# Patient Record
Sex: MALE | Race: WHITE | NOT HISPANIC OR LATINO | Employment: FULL TIME | URBAN - METROPOLITAN AREA
[De-identification: names, ages, dates, MRNs, and addresses within clinical notes are randomized per-mention and may not be internally consistent; named-entity substitution may affect disease eponyms.]

---

## 2017-08-04 ENCOUNTER — PATIENT OUTREACH (OUTPATIENT)
Dept: ADMINISTRATIVE | Facility: HOSPITAL | Age: 47
End: 2017-08-04

## 2020-05-12 ENCOUNTER — TELEPHONE (OUTPATIENT)
Dept: PULMONOLOGY | Facility: CLINIC | Age: 50
End: 2020-05-12

## 2020-05-12 DIAGNOSIS — R06.02 SOB (SHORTNESS OF BREATH): Primary | ICD-10-CM

## 2020-05-12 NOTE — TELEPHONE ENCOUNTER
----- Message from Rosemary Lexis sent at 5/12/2020 11:46 AM CDT -----  Contact: sister  Type: Needs Medical Advice    Who Called:      Best Call Back Number:   Additional Information: Requesting a call back from Nurse, regarding sister has questions about est care appt ,please call back with advice

## 2020-06-18 ENCOUNTER — TELEPHONE (OUTPATIENT)
Dept: PULMONOLOGY | Facility: CLINIC | Age: 50
End: 2020-06-18

## 2020-08-12 ENCOUNTER — OFFICE VISIT (OUTPATIENT)
Dept: PULMONOLOGY | Facility: CLINIC | Age: 50
End: 2020-08-12
Payer: COMMERCIAL

## 2020-08-12 VITALS
OXYGEN SATURATION: 96 % | WEIGHT: 265 LBS | DIASTOLIC BLOOD PRESSURE: 82 MMHG | SYSTOLIC BLOOD PRESSURE: 134 MMHG | HEIGHT: 72 IN | HEART RATE: 104 BPM | BODY MASS INDEX: 35.89 KG/M2 | RESPIRATION RATE: 19 BRPM

## 2020-08-12 DIAGNOSIS — U07.1 COVID-19: Primary | ICD-10-CM

## 2020-08-12 DIAGNOSIS — G47.33 OSA (OBSTRUCTIVE SLEEP APNEA): ICD-10-CM

## 2020-08-12 DIAGNOSIS — R06.02 SHORTNESS OF BREATH: ICD-10-CM

## 2020-08-12 DIAGNOSIS — R91.1 SOLITARY PULMONARY NODULE: ICD-10-CM

## 2020-08-12 DIAGNOSIS — J44.89 ASTHMA WITH COPD: Primary | ICD-10-CM

## 2020-08-12 PROCEDURE — 99205 PR OFFICE/OUTPT VISIT, NEW, LEVL V, 60-74 MIN: ICD-10-PCS | Mod: S$GLB,,, | Performed by: INTERNAL MEDICINE

## 2020-08-12 PROCEDURE — 3075F SYST BP GE 130 - 139MM HG: CPT | Mod: CPTII,S$GLB,, | Performed by: INTERNAL MEDICINE

## 2020-08-12 PROCEDURE — 3079F DIAST BP 80-89 MM HG: CPT | Mod: CPTII,S$GLB,, | Performed by: INTERNAL MEDICINE

## 2020-08-12 PROCEDURE — 99999 PR PBB SHADOW E&M-EST. PATIENT-LVL III: CPT | Mod: PBBFAC,,, | Performed by: INTERNAL MEDICINE

## 2020-08-12 PROCEDURE — 99999 PR PBB SHADOW E&M-EST. PATIENT-LVL III: ICD-10-PCS | Mod: PBBFAC,,, | Performed by: INTERNAL MEDICINE

## 2020-08-12 PROCEDURE — 3075F PR MOST RECENT SYSTOLIC BLOOD PRESS GE 130-139MM HG: ICD-10-PCS | Mod: CPTII,S$GLB,, | Performed by: INTERNAL MEDICINE

## 2020-08-12 PROCEDURE — 3079F PR MOST RECENT DIASTOLIC BLOOD PRESSURE 80-89 MM HG: ICD-10-PCS | Mod: CPTII,S$GLB,, | Performed by: INTERNAL MEDICINE

## 2020-08-12 PROCEDURE — 3008F PR BODY MASS INDEX (BMI) DOCUMENTED: ICD-10-PCS | Mod: CPTII,S$GLB,, | Performed by: INTERNAL MEDICINE

## 2020-08-12 PROCEDURE — 3008F BODY MASS INDEX DOCD: CPT | Mod: CPTII,S$GLB,, | Performed by: INTERNAL MEDICINE

## 2020-08-12 PROCEDURE — 99205 OFFICE O/P NEW HI 60 MIN: CPT | Mod: S$GLB,,, | Performed by: INTERNAL MEDICINE

## 2020-08-12 RX ORDER — ALBUTEROL SULFATE 90 UG/1
2 AEROSOL, METERED RESPIRATORY (INHALATION) EVERY 4 HOURS PRN
Qty: 18 G | Refills: 11 | Status: SHIPPED | OUTPATIENT
Start: 2020-08-12 | End: 2020-09-08 | Stop reason: SDUPTHER

## 2020-08-12 RX ORDER — ALBUTEROL SULFATE 90 UG/1
2 AEROSOL, METERED RESPIRATORY (INHALATION) EVERY 4 HOURS PRN
Qty: 18 G | Refills: 11 | Status: SHIPPED | OUTPATIENT
Start: 2020-08-12 | End: 2020-08-12 | Stop reason: SDUPTHER

## 2020-08-12 NOTE — PROGRESS NOTES
Subjective:     Patient ID: Kvng Ba Jr. is a 49 y.o. male.    Chief Complaint:      HPI]    COPD  He presents for evaluation and treatment of Asthma/COPD. The patient is currently having symptoms / an exacerbation. Current symptoms include acute dyspnea, chronic dyspnea, dyspnea after 2 blocks and dyspnea after 2 flights of stairs. Symptoms have been present since several months ago and have been gradually worsening. He denies chills and fever. Associated symptoms include chest pain, poor exercise tolerance and shortness of breath.  This episode appears to have been triggered by no identifiable factor. Treatments tried for the current exacerbation: none. The patient has been having similar episodes for approximately 3 years. He uses 2 pillows at night. Patient currently is not on home oxygen therapy.. The patient is having no constitutional symptoms, denying fever, chills, anorexia, or weight loss. The patient has not been hospitalized for this condition before. He has a history of 35 pack years. The patient is experiencing exercise intolerance (difficulty climbing 3 flights of stairs).    Insomnia and Obstructive Sleep Apnea with GERD       Sleep Apnea  He presents for a sleep evaluation. He complains of snoring, periods of not breathing, decreased memory, decreased concentration, excessive daytime sleepiness, falling asleep while driving, reading, watching television, feels sleepy during the day, take naps during the day.  Symptoms began 15 years years ago, gradually worsening since that time.  He goes to sleep at 3 - am weekdays and  weekends. He awakens 7  weekdays and  weekends. He falls asleep in 5 minutes.  Collar size na. He denies knees buckling with laughing, completely or partially paralyzed while falling asleep or waking up. Previous evaluation and treatment has included PSG. EPWORTH 16    Hx of restless legs  Hx of polysomnogram in 2006 -  At [facility] -   Leg and back  probelms    Past Medical History:   Diagnosis Date    ADHD (attention deficit hyperactivity disorder)     Anxiety     Chronic back pain     DJD (degenerative joint disease) of lumbar spine     GERD (gastroesophageal reflux disease)     High cholesterol      Past Surgical History:   Procedure Laterality Date    ARTHROPLASTY      R knee    FEMUR FRACTURE SURGERY      jose in place    LUMBAR DISCECTOMY      L4-L5    LUMBAR FUSION      L4-L5     Review of patient's allergies indicates:  No Known Allergies  Current Outpatient Medications on File Prior to Visit   Medication Sig Dispense Refill    avanafil 200 mg Tab Take 200 mg by mouth once daily. (Patient not taking: Reported on 2020) 3 tablet 6    metoprolol succinate (TOPROL-XL) 25 MG 24 hr tablet Take 1 tablet (25 mg total) by mouth once daily. (Patient not taking: Reported on 2020) 30 tablet 11     No current facility-administered medications on file prior to visit.      Social History     Socioeconomic History    Marital status:      Spouse name: Not on file    Number of children: Not on file    Years of education: Not on file    Highest education level: Not on file   Occupational History    Not on file   Social Needs    Financial resource strain: Not on file    Food insecurity     Worry: Not on file     Inability: Not on file    Transportation needs     Medical: Not on file     Non-medical: Not on file   Tobacco Use    Smoking status: Former Smoker     Packs/day: 1.00     Years: 35.00     Pack years: 35.00     Types: Cigarettes     Start date: 1985     Quit date: 2020     Years since quittin.2    Smokeless tobacco: Never Used    Tobacco comment: Quit 2020   Substance and Sexual Activity    Alcohol use: Yes     Comment: weekly    Drug use: No    Sexual activity: Yes   Lifestyle    Physical activity     Days per week: Not on file     Minutes per session: Not on file    Stress: Not on file    Relationships    Social connections     Talks on phone: Not on file     Gets together: Not on file     Attends Religion service: Not on file     Active member of club or organization: Not on file     Attends meetings of clubs or organizations: Not on file     Relationship status: Not on file   Other Topics Concern    Not on file   Social History Narrative    Not on file     Family History   Problem Relation Age of Onset    Hypertension Mother     Heart disease Mother     Cancer Mother     Hypertension Father     Cancer Father        Review of Systems   Constitutional: Positive for fatigue. Negative for fever.   HENT: Positive for postnasal drip, rhinorrhea and congestion.    Eyes: Negative for redness and itching.   Respiratory: Positive for cough, sputum production, shortness of breath, dyspnea on extertion, use of rescue inhaler and Paroxysmal Nocturnal Dyspnea.    Cardiovascular: Negative for chest pain, palpitations and leg swelling.   Genitourinary: Negative for difficulty urinating and hematuria.   Endocrine: Negative for cold intolerance and heat intolerance.    Skin: Negative for rash.   Gastrointestinal: Negative for nausea and abdominal pain.   Neurological: Negative for dizziness, syncope, weakness and light-headedness.   Hematological: Negative for adenopathy. Does not bruise/bleed easily.   Psychiatric/Behavioral: Negative for sleep disturbance. The patient is not nervous/anxious.        Objective:      /82   Pulse 104   Resp 19   Ht 6' (1.829 m)   Wt 120.2 kg (264 lb 15.9 oz)   SpO2 96%   BMI 35.94 kg/m²   Physical Exam  Vitals signs and nursing note reviewed.   Constitutional:       Appearance: He is well-developed.   HENT:      Head: Normocephalic and atraumatic.   Eyes:      Conjunctiva/sclera: Conjunctivae normal.      Pupils: Pupils are equal, round, and reactive to light.   Neck:      Musculoskeletal: Neck supple.      Thyroid: No thyromegaly.      Vascular: No JVD.       Trachea: No tracheal deviation.   Cardiovascular:      Rate and Rhythm: Normal rate and regular rhythm.      Heart sounds: No murmur.   Pulmonary:      Breath sounds: Examination of the right-lower field reveals wheezing. Examination of the left-lower field reveals wheezing. Decreased breath sounds and wheezing present. No rhonchi or rales.   Abdominal:      General: Bowel sounds are normal.      Palpations: Abdomen is soft.   Musculoskeletal: Normal range of motion.         General: No tenderness.   Lymphadenopathy:      Cervical: No cervical adenopathy.   Skin:     General: Skin is warm and dry.   Neurological:      Mental Status: He is alert and oriented to person, place, and time.       Personal Diagnostic Review  Na    X-Ray Chest PA And Lateral  Narrative: Two-view chest x-ray.05/10/14 22:49:00    Clinical indication: Chest pain    Heart size is normal. The lung fields are clear. No acute pulmonary infiltrate.  Impression:      Negative two-view chest x-ray.    Electronically signed by: LAUREANO RUBIO MD  Date:     05/11/14  Time:    09:41       Office Spirometry Results:     No flowsheet data found.  Pulmonary Studies Review 8/12/2020   SpO2 96   Height 72   Weight 4239.89   BMI (Calculated) 35.9   Predicted Distance 445.54   Predicted Distance Meters (Calculated) 618.02         Assessment:            Asthma with COPD  -     Discontinue: albuterol (PROVENTIL/VENTOLIN HFA) 90 mcg/actuation inhaler; Inhale 2 puffs into the lungs every 4 (four) hours as needed for Wheezing or Shortness of Breath.  Dispense: 18 g; Refill: 11  -     albuterol (PROVENTIL/VENTOLIN HFA) 90 mcg/actuation inhaler; Inhale 2 puffs into the lungs every 4 (four) hours as needed for Wheezing or Shortness of Breath.  Dispense: 18 g; Refill: 11    Solitary pulmonary nodule  -     CT Chest Without Contrast; Future; Expected date: 08/12/2020    Shortness of breath  -     CT Chest Without Contrast; Future; Expected date: 08/12/2020  -      Spirometry without Bronchodilator; Future; Expected date: 08/12/2020  -     Discontinue: albuterol (PROVENTIL/VENTOLIN HFA) 90 mcg/actuation inhaler; Inhale 2 puffs into the lungs every 4 (four) hours as needed for Wheezing or Shortness of Breath.  Dispense: 18 g; Refill: 11  -     albuterol (PROVENTIL/VENTOLIN HFA) 90 mcg/actuation inhaler; Inhale 2 puffs into the lungs every 4 (four) hours as needed for Wheezing or Shortness of Breath.  Dispense: 18 g; Refill: 11    RC (obstructive sleep apnea)  -     Home Sleep Studies; Future; Expected date: 08/12/2020          Outpatient Encounter Medications as of 8/12/2020   Medication Sig Dispense Refill    albuterol (PROVENTIL/VENTOLIN HFA) 90 mcg/actuation inhaler Inhale 2 puffs into the lungs every 4 (four) hours as needed for Wheezing or Shortness of Breath. 18 g 11    avanafil 200 mg Tab Take 200 mg by mouth once daily. (Patient not taking: Reported on 8/12/2020) 3 tablet 6    metoprolol succinate (TOPROL-XL) 25 MG 24 hr tablet Take 1 tablet (25 mg total) by mouth once daily. (Patient not taking: Reported on 8/12/2020) 30 tablet 11    [DISCONTINUED] albuterol (PROVENTIL/VENTOLIN HFA) 90 mcg/actuation inhaler Inhale 2 puffs into the lungs every 4 (four) hours as needed for Wheezing or Shortness of Breath. 18 g 11     No facility-administered encounter medications on file as of 8/12/2020.      Plan:       Requested Prescriptions     Signed Prescriptions Disp Refills    albuterol (PROVENTIL/VENTOLIN HFA) 90 mcg/actuation inhaler 18 g 11     Sig: Inhale 2 puffs into the lungs every 4 (four) hours as needed for Wheezing or Shortness of Breath.     Problem List Items Addressed This Visit     None      Visit Diagnoses     Asthma with COPD    -  Primary    Relevant Medications    albuterol (PROVENTIL/VENTOLIN HFA) 90 mcg/actuation inhaler    Solitary pulmonary nodule        Relevant Orders    CT Chest Without Contrast    Shortness of breath        Relevant Medications     albuterol (PROVENTIL/VENTOLIN HFA) 90 mcg/actuation inhaler    Other Relevant Orders    CT Chest Without Contrast    Spirometry without Bronchodilator    RC (obstructive sleep apnea)        Relevant Orders    Home Sleep Studies             Follow up in about 4 weeks (around 9/9/2020) for Release Medical Records - Sleep Study.    MEDICAL DECISION MAKING: Moderate to high complexity.  Overall, the multiple problems listed are of moderate to high severity that may impact quality of life and activities of daily living. Side effects of medications, treatment plan as well as options and alternatives reviewed and discussed with patient. There was counseling of patient concerning these issues.    Total time spent in face to face counseling and coordination of care - 60  minutes over 50% of time was used in discussion of prognosis, risks, benefits of treatment, instructions and compliance with regimen . Discussion with other physicians or health care providers (DME, NP, pharmacy, respiratory therapy) occurred.

## 2020-08-12 NOTE — PATIENT INSTRUCTIONS
[facility] to schedule sleep study at  [phone] . Usually take 1- 2 days to get insurance company approval.  You will need to schedule at follow up clinic appointment 7 days after the sleep study to review the results.

## 2020-08-13 ENCOUNTER — TELEPHONE (OUTPATIENT)
Dept: PULMONOLOGY | Facility: HOSPITAL | Age: 50
End: 2020-08-13

## 2020-08-15 ENCOUNTER — LAB VISIT (OUTPATIENT)
Dept: OTOLARYNGOLOGY | Facility: CLINIC | Age: 50
End: 2020-08-15
Payer: COMMERCIAL

## 2020-08-15 DIAGNOSIS — U07.1 COVID-19: ICD-10-CM

## 2020-08-15 PROCEDURE — U0003 INFECTIOUS AGENT DETECTION BY NUCLEIC ACID (DNA OR RNA); SEVERE ACUTE RESPIRATORY SYNDROME CORONAVIRUS 2 (SARS-COV-2) (CORONAVIRUS DISEASE [COVID-19]), AMPLIFIED PROBE TECHNIQUE, MAKING USE OF HIGH THROUGHPUT TECHNOLOGIES AS DESCRIBED BY CMS-2020-01-R: HCPCS

## 2020-08-16 LAB — SARS-COV-2 RNA RESP QL NAA+PROBE: NOT DETECTED

## 2020-08-18 ENCOUNTER — HOSPITAL ENCOUNTER (OUTPATIENT)
Dept: RADIOLOGY | Facility: HOSPITAL | Age: 50
Discharge: HOME | End: 2020-08-18
Attending: INTERNAL MEDICINE
Payer: COMMERCIAL

## 2020-08-18 ENCOUNTER — CLINICAL SUPPORT (OUTPATIENT)
Dept: PULMONOLOGY | Facility: CLINIC | Age: 50
End: 2020-08-18
Payer: COMMERCIAL

## 2020-08-18 DIAGNOSIS — R06.02 SHORTNESS OF BREATH: ICD-10-CM

## 2020-08-18 DIAGNOSIS — R91.1 SOLITARY PULMONARY NODULE: ICD-10-CM

## 2020-08-18 PROCEDURE — 71250 CT THORAX DX C-: CPT | Mod: TC

## 2020-08-18 PROCEDURE — 71250 CT THORAX DX C-: CPT | Mod: 26,,, | Performed by: RADIOLOGY

## 2020-08-18 PROCEDURE — 94010 BREATHING CAPACITY TEST: ICD-10-PCS | Mod: S$GLB,,, | Performed by: INTERNAL MEDICINE

## 2020-08-18 PROCEDURE — 71250 CT CHEST WITHOUT CONTRAST: ICD-10-PCS | Mod: 26,,, | Performed by: RADIOLOGY

## 2020-08-18 PROCEDURE — 94010 BREATHING CAPACITY TEST: CPT | Mod: S$GLB,,, | Performed by: INTERNAL MEDICINE

## 2020-08-19 LAB
BRPFT: NORMAL
FEF 25 75 LLN: 2.04
FEF 25 75 PRE REF: 93.7 %
FEF 25 75 REF: 3.76
FEV1 FVC LLN: 68
FEV1 FVC PRE REF: 101.9 %
FEV1 FVC REF: 79
FEV1 LLN: 3.27
FEV1 PRE REF: 86 %
FEV1 REF: 4.19
FVC LLN: 4.18
FVC PRE REF: 84 %
FVC REF: 5.35
PEF LLN: 7.89
PEF PRE REF: 93.3 %
PEF REF: 10.34
PRE FEF 25 75: 3.53 L/S (ref 2.04–5.49)
PRE FET 100: 7.46 SEC
PRE FEV1 FVC: 80.21 % (ref 67.87–89.6)
PRE FEV1: 3.6 L (ref 3.27–5.11)
PRE FVC: 4.49 L (ref 4.18–6.52)
PRE PEF: 9.65 L/S (ref 7.89–12.8)

## 2020-08-19 PROCEDURE — 95800 SLP STDY UNATTENDED: CPT | Mod: 26,,, | Performed by: INTERNAL MEDICINE

## 2020-08-19 PROCEDURE — 95800 PR SLEEP STUDY, UNATTENDED, RECORD HEART RATE/O2 SAT/RESP ANAL/SLEEP TIME: ICD-10-PCS | Mod: 26,,, | Performed by: INTERNAL MEDICINE

## 2020-08-21 DIAGNOSIS — R91.1 SOLITARY PULMONARY NODULE: ICD-10-CM

## 2020-08-21 DIAGNOSIS — K76.9 LIVER DISEASE, UNSPECIFIED: ICD-10-CM

## 2020-08-21 DIAGNOSIS — R93.2 ABNORMAL CT SCAN, LIVER: Primary | ICD-10-CM

## 2020-08-21 RX ORDER — ALPRAZOLAM 0.5 MG/1
0.5 TABLET ORAL
Qty: 1 TABLET | Refills: 0 | Status: SHIPPED | OUTPATIENT
Start: 2020-08-21 | End: 2020-09-08 | Stop reason: ALTCHOICE

## 2020-08-21 NOTE — PROGRESS NOTES
MRI of abdomen ordered  prescription for xanax sent to [facility] for pre procedure sedation

## 2020-08-21 NOTE — Clinical Note
Called patient but unable to contact  MRI of abdomen ordered - please schedule  prescription for xanax sent to [facility] for pre procedure sedation

## 2020-08-24 ENCOUNTER — PROCEDURE VISIT (OUTPATIENT)
Dept: SLEEP MEDICINE | Facility: CLINIC | Age: 50
End: 2020-08-24
Payer: COMMERCIAL

## 2020-08-24 DIAGNOSIS — G47.33 OSA (OBSTRUCTIVE SLEEP APNEA): Primary | ICD-10-CM

## 2020-08-24 PROCEDURE — 95810 POLYSOM 6/> YRS 4/> PARAM: CPT

## 2020-08-24 NOTE — Clinical Note
PHYSICIAN INTERPRETATION AND COMMENTS: Findings are consistent with very severe, non-positional obstructive  sleep apnea (RC), with indications of respiratory control instability. Night #2: AHI 83.0/hr with 7.5 hours data. SpO2 mira was  72.9%. Based on the [employer] practice parameter of CPAP would be the guideline recommendation of  choice. INLAB CPAP titration prefered. Other therapies may include ENT procedures were appropriate, significant weight loss.  Inspire hypoglossal nerve stimulator and nasal PROVENT or mandibular advancement device may also be considered. Close follow  up to ensure resolution of symptoms. Indications of cardiac dysrhythmia are recorded. Please refer to cardiology  CLINICAL HISTORY: 49 year old male presented with: 18 inch neck, BMI of 31, an [place] sleepiness score of 10, history of  hypertension, a previous diagnosis of RC and symptoms of nocturnal snoring, waking up choking and witnessed apneas. Based on

## 2020-08-26 ENCOUNTER — TELEPHONE (OUTPATIENT)
Dept: PULMONOLOGY | Facility: CLINIC | Age: 50
End: 2020-08-26

## 2020-08-26 NOTE — TELEPHONE ENCOUNTER
----- Message from Jose Oden MD sent at 8/21/2020  4:54 PM CDT -----  Your CT of the chest demonstrated some small nodules that are too small to biopsy. Because of your prior smoking history you are consider a high risk patient (even though you stopped smoking years ago). I have ordered aCT of the chest to be performed in one year. The radiologist also described an abnormality of the liver. I have ordered a MRI to further investigate this issue.

## 2020-08-28 ENCOUNTER — TELEPHONE (OUTPATIENT)
Dept: RADIOLOGY | Facility: HOSPITAL | Age: 50
End: 2020-08-28

## 2020-08-28 NOTE — PROCEDURES
Home Sleep Studies    Date/Time: 8/24/2020 8:00 AM  Performed by: Bladimir Garrison MD  Authorized by: Jose Oden MD       PHYSICIAN INTERPRETATION AND COMMENTS: Findings are consistent with very severe, non-positional obstructive  sleep apnea (RC), with indications of respiratory control instability. Night #2: AHI 83.0/hr with 7.5 hours data. SpO2 mira was  72.9%. Based on the American academy Sleep Medicine practice parameter of CPAP would be the guideline recommendation of  choice. INLAB CPAP titration prefered. Other therapies may include ENT procedures were appropriate, significant weight loss.  Inspire hypoglossal nerve stimulator and nasal PROVENT or mandibular advancement device may also be considered. Close follow  up to ensure resolution of symptoms. Indications of cardiac dysrhythmia are recorded. Please refer to cardiology  CLINICAL HISTORY: 49 year old male presented with: 18 inch neck, BMI of 31, an [place] sleepiness score of 10, history of  hypertension, a previous diagnosis of RC and symptoms of nocturnal snoring, waking up choking and witnessed apneas. Based on  the clinical history, the patient has a high pre-test probability of having severe RC. Prior Polysomnography 2006.History of Restless  legs.

## 2020-08-31 ENCOUNTER — LAB VISIT (OUTPATIENT)
Dept: LAB | Facility: HOSPITAL | Age: 50
End: 2020-08-31
Attending: RADIOLOGY
Payer: COMMERCIAL

## 2020-08-31 ENCOUNTER — TELEPHONE (OUTPATIENT)
Dept: PULMONOLOGY | Facility: CLINIC | Age: 50
End: 2020-08-31

## 2020-08-31 ENCOUNTER — HOSPITAL ENCOUNTER (OUTPATIENT)
Dept: RADIOLOGY | Facility: HOSPITAL | Age: 50
Discharge: HOME | End: 2020-08-31
Attending: INTERNAL MEDICINE
Payer: COMMERCIAL

## 2020-08-31 DIAGNOSIS — R93.2 ABNORMAL CT SCAN, LIVER: ICD-10-CM

## 2020-08-31 DIAGNOSIS — K76.9 LIVER DISEASE: Primary | ICD-10-CM

## 2020-08-31 DIAGNOSIS — K76.9 LIVER DISEASE, UNSPECIFIED: ICD-10-CM

## 2020-08-31 DIAGNOSIS — K76.9 LIVER DISEASE: ICD-10-CM

## 2020-08-31 LAB
CREAT SERPL-MCNC: 1.3 MG/DL (ref 0.5–1.4)
EST. GFR  (AFRICAN AMERICAN): >60 ML/MIN/1.73 M^2
EST. GFR  (NON AFRICAN AMERICAN): >60 ML/MIN/1.73 M^2

## 2020-08-31 PROCEDURE — 25500020 PHARM REV CODE 255: Performed by: INTERNAL MEDICINE

## 2020-08-31 PROCEDURE — 74183 MRI ABD W/O CNTR FLWD CNTR: CPT | Mod: 26,,, | Performed by: RADIOLOGY

## 2020-08-31 PROCEDURE — 74183 MRI ABD W/O CNTR FLWD CNTR: CPT | Mod: TC

## 2020-08-31 PROCEDURE — 82565 ASSAY OF CREATININE: CPT

## 2020-08-31 PROCEDURE — 74183 MRI ABDOMEN W WO CONTRAST: ICD-10-PCS | Mod: 26,,, | Performed by: RADIOLOGY

## 2020-08-31 PROCEDURE — 36415 COLL VENOUS BLD VENIPUNCTURE: CPT

## 2020-08-31 PROCEDURE — A9585 GADOBUTROL INJECTION: HCPCS | Performed by: INTERNAL MEDICINE

## 2020-08-31 RX ORDER — GADOBUTROL 604.72 MG/ML
10 INJECTION INTRAVENOUS
Status: COMPLETED | OUTPATIENT
Start: 2020-08-31 | End: 2020-08-31

## 2020-08-31 RX ADMIN — GADOBUTROL 10 ML: 604.72 INJECTION INTRAVENOUS at 11:08

## 2020-08-31 NOTE — TELEPHONE ENCOUNTER
----- Message from Fior Mccoy sent at 8/31/2020  2:08 PM CDT -----  patient needs to be seen 9/8 for followup as he's leaving town on 9/9 and won't be back for couple months. please work in....[phone] (home)

## 2020-09-08 ENCOUNTER — OFFICE VISIT (OUTPATIENT)
Dept: PULMONOLOGY | Facility: CLINIC | Age: 50
End: 2020-09-08
Payer: COMMERCIAL

## 2020-09-08 VITALS
WEIGHT: 271.5 LBS | BODY MASS INDEX: 36.77 KG/M2 | OXYGEN SATURATION: 98 % | HEIGHT: 72 IN | DIASTOLIC BLOOD PRESSURE: 104 MMHG | RESPIRATION RATE: 18 BRPM | SYSTOLIC BLOOD PRESSURE: 140 MMHG | HEART RATE: 91 BPM

## 2020-09-08 DIAGNOSIS — J44.89 ASTHMA WITH COPD: ICD-10-CM

## 2020-09-08 DIAGNOSIS — R06.02 SHORTNESS OF BREATH: ICD-10-CM

## 2020-09-08 DIAGNOSIS — G47.33 OSA (OBSTRUCTIVE SLEEP APNEA): Primary | ICD-10-CM

## 2020-09-08 DIAGNOSIS — G25.81 RESTLESS LEGS SYNDROME (RLS): ICD-10-CM

## 2020-09-08 DIAGNOSIS — I10 ESSENTIAL HYPERTENSION: ICD-10-CM

## 2020-09-08 PROCEDURE — 3008F BODY MASS INDEX DOCD: CPT | Mod: CPTII,S$GLB,, | Performed by: INTERNAL MEDICINE

## 2020-09-08 PROCEDURE — 3077F PR MOST RECENT SYSTOLIC BLOOD PRESSURE >= 140 MM HG: ICD-10-PCS | Mod: CPTII,S$GLB,, | Performed by: INTERNAL MEDICINE

## 2020-09-08 PROCEDURE — 3080F PR MOST RECENT DIASTOLIC BLOOD PRESSURE >= 90 MM HG: ICD-10-PCS | Mod: CPTII,S$GLB,, | Performed by: INTERNAL MEDICINE

## 2020-09-08 PROCEDURE — 99999 PR PBB SHADOW E&M-EST. PATIENT-LVL III: CPT | Mod: PBBFAC,,, | Performed by: INTERNAL MEDICINE

## 2020-09-08 PROCEDURE — 3080F DIAST BP >= 90 MM HG: CPT | Mod: CPTII,S$GLB,, | Performed by: INTERNAL MEDICINE

## 2020-09-08 PROCEDURE — 99214 PR OFFICE/OUTPT VISIT, EST, LEVL IV, 30-39 MIN: ICD-10-PCS | Mod: S$GLB,,, | Performed by: INTERNAL MEDICINE

## 2020-09-08 PROCEDURE — 99999 PR PBB SHADOW E&M-EST. PATIENT-LVL III: ICD-10-PCS | Mod: PBBFAC,,, | Performed by: INTERNAL MEDICINE

## 2020-09-08 PROCEDURE — 99214 OFFICE O/P EST MOD 30 MIN: CPT | Mod: S$GLB,,, | Performed by: INTERNAL MEDICINE

## 2020-09-08 PROCEDURE — 3008F PR BODY MASS INDEX (BMI) DOCUMENTED: ICD-10-PCS | Mod: CPTII,S$GLB,, | Performed by: INTERNAL MEDICINE

## 2020-09-08 PROCEDURE — 3077F SYST BP >= 140 MM HG: CPT | Mod: CPTII,S$GLB,, | Performed by: INTERNAL MEDICINE

## 2020-09-08 RX ORDER — PRAMIPEXOLE DIHYDROCHLORIDE 0.25 MG/1
0.25 TABLET ORAL NIGHTLY
Qty: 30 TABLET | Refills: 11 | Status: SHIPPED | OUTPATIENT
Start: 2020-09-08 | End: 2023-05-11

## 2020-09-08 RX ORDER — METOPROLOL SUCCINATE 25 MG/1
25 TABLET, EXTENDED RELEASE ORAL DAILY
Qty: 30 TABLET | Refills: 11 | Status: SHIPPED | OUTPATIENT
Start: 2020-09-08 | End: 2023-05-11 | Stop reason: SDUPTHER

## 2020-09-08 RX ORDER — ALBUTEROL SULFATE 90 UG/1
2 AEROSOL, METERED RESPIRATORY (INHALATION) EVERY 4 HOURS PRN
Qty: 18 G | Refills: 11 | Status: SHIPPED | OUTPATIENT
Start: 2020-09-08 | End: 2023-05-11 | Stop reason: SDUPTHER

## 2020-09-08 NOTE — PROGRESS NOTES
Subjective:     Patient ID: Kvng Ba Jr. is a 50 y.o. male.    Chief Complaint:      HPI   COPD  He presents for evaluation and treatment of Asthma/COPD. The patient is currently having symptoms / an exacerbation. Current symptoms include acute dyspnea, chronic dyspnea, dyspnea after 2 blocks and dyspnea after 2 flights of stairs. Symptoms have been present since several months ago and have been gradually worsening. He denies chills and fever. Associated symptoms include chest pain, poor exercise tolerance and shortness of breath.  This episode appears to have been triggered by no identifiable factor. Treatments tried for the current exacerbation: none. The patient has been having similar episodes for approximately 3 years. He uses 2 pillows at night. Patient currently is not on home oxygen therapy.. The patient is having no constitutional symptoms, denying fever, chills, anorexia, or weight loss. The patient has not been hospitalized for this condition before. He has a history of 35 pack years. The patient is experiencing exercise intolerance (difficulty climbing 3 flights of stairs).     Insomnia and Obstructive Sleep Apnea with GERD         Sleep Apnea  He presents for a sleep evaluation. He complains of snoring, periods of not breathing, decreased memory, decreased concentration, excessive daytime sleepiness, falling asleep while driving, reading, watching television, feels sleepy during the day, take naps during the day.  Symptoms began 15 years years ago, gradually worsening since that time.  He goes to sleep at 3 - am weekdays and  weekends. He awakens 7  weekdays and  weekends. He falls asleep in 5 minutes.  Collar size na. He denies knees buckling with laughing, completely or partially paralyzed while falling asleep or waking up. Previous evaluation and treatment has included PSG. EPWORTH 16     Hx of restless legs  Hx of polysomnogram in 2006 -  At [facility] - see below  Leg  and back probelms       Home Sleep Studies   Date/Time: 8/24/2020 8:00 AM  Performed by: Bladimir Garrison MD  Authorized by: Jose Oden MD        PHYSICIAN INTERPRETATION AND COMMENTS: Findings are consistent with very severe, non-positional obstructive  sleep apnea (RC), with indications of respiratory control instability. Night #2: AHI 83.0/hr with 7.5 hours data. SpO2 mira was  72.9%. Based on the American academy Sleep Medicine practice parameter of CPAP would be the guideline recommendation of  choice. INLAB CPAP titration prefered. Other therapies may include ENT procedures were appropriate, significant weight loss.  Inspire hypoglossal nerve stimulator and nasal PROVENT or mandibular advancement device may also be considered. Close follow  up to ensure resolution of symptoms. Indications of cardiac dysrhythmia are recorded. Please refer to cardiology  CLINICAL HISTORY: 49 year old male presented with: 18 inch neck, BMI of 31, an Malden sleepiness score of 10, history of  hypertension, a previous diagnosis of RC and symptoms of nocturnal snoring, waking up choking and witnessed apneas. Based on  the clinical history, the patient has a high pre-test probability of having severe RC. Prior Polysomnography 2006.History of Restless  legs.      Past Medical History:   Diagnosis Date    ADHD (attention deficit hyperactivity disorder)     Anxiety     Chronic back pain     DJD (degenerative joint disease) of lumbar spine     GERD (gastroesophageal reflux disease)     High cholesterol      Past Surgical History:   Procedure Laterality Date    ARTHROPLASTY      R knee    FEMUR FRACTURE SURGERY  2002    jose in place    LUMBAR DISCECTOMY  2000    L4-L5    LUMBAR FUSION      L4-L5     Review of patient's allergies indicates:  No Known Allergies  Current Outpatient Medications on File Prior to Visit   Medication Sig Dispense Refill    avanafil 200 mg Tab Take 200 mg by mouth once daily. 3 tablet 6      No current facility-administered medications on file prior to visit.      Social History     Socioeconomic History    Marital status:      Spouse name: Not on file    Number of children: Not on file    Years of education: Not on file    Highest education level: Not on file   Occupational History    Not on file   Social Needs    Financial resource strain: Not on file    Food insecurity     Worry: Not on file     Inability: Not on file    Transportation needs     Medical: Not on file     Non-medical: Not on file   Tobacco Use    Smoking status: Former Smoker     Packs/day: 1.00     Years: 35.00     Pack years: 35.00     Types: Cigarettes     Start date: 1985     Quit date: 2020     Years since quittin.2    Smokeless tobacco: Never Used    Tobacco comment: Quit 2020   Substance and Sexual Activity    Alcohol use: Yes     Comment: weekly    Drug use: No    Sexual activity: Yes   Lifestyle    Physical activity     Days per week: Not on file     Minutes per session: Not on file    Stress: Not on file   Relationships    Social connections     Talks on phone: Not on file     Gets together: Not on file     Attends Mu-ism service: Not on file     Active member of club or organization: Not on file     Attends meetings of clubs or organizations: Not on file     Relationship status: Not on file   Other Topics Concern    Not on file   Social History Narrative    Not on file     Family History   Problem Relation Age of Onset    Hypertension Mother     Heart disease Mother     Cancer Mother     Hypertension Father     Cancer Father        Review of Systems   Constitutional: Positive for fatigue.   HENT: Negative.    Respiratory: Positive for apnea.    Cardiovascular: Negative.    Genitourinary: Negative.    Endocrine: endocrine negative   Musculoskeletal: Negative.    Skin: Negative.    Gastrointestinal: Negative.    Neurological: Negative.    Psychiatric/Behavioral: Positive for  sleep disturbance.       Objective:      BP (!) 140/104   Pulse 91   Resp 18   Ht 6' (1.829 m)   Wt 123.1 kg (271 lb 7.9 oz)   SpO2 98%   BMI 36.82 kg/m²   Physical Exam  Vitals signs and nursing note reviewed.   Constitutional:       Appearance: He is well-developed.   HENT:      Head: Normocephalic and atraumatic.   Eyes:      Conjunctiva/sclera: Conjunctivae normal.      Pupils: Pupils are equal, round, and reactive to light.   Neck:      Musculoskeletal: Neck supple.      Thyroid: No thyromegaly.      Vascular: No JVD.      Trachea: No tracheal deviation.   Cardiovascular:      Rate and Rhythm: Normal rate and regular rhythm.      Heart sounds: Normal heart sounds.   Pulmonary:      Effort: Pulmonary effort is normal.      Breath sounds: Normal breath sounds.   Abdominal:      Palpations: Abdomen is soft.   Musculoskeletal: Normal range of motion.   Lymphadenopathy:      Cervical: No cervical adenopathy.   Skin:     General: Skin is warm and dry.   Neurological:      Mental Status: He is alert and oriented to person, place, and time.       Personal Diagnostic Review  PSG: significant for Obstructive Sleep Apnea   MRI Abdomen W WO Contrast  Narrative: EXAMINATION:  MRI ABDOMEN W WO CONTRAST    CLINICAL HISTORY:  Liver lesion, > 1cm, US nondiagnostic;  Liver disease, unspecified    TECHNIQUE:  Multiplanar multisequence MR imaging of the abdomen is performed with and without contrast.  10 cc of Gadavist was administered without immediate complication.    COMPARISON:  CT of the chest from 08/18/2020    FINDINGS:  Due to the location of the lesion near the dome of the liver, there is poor signal due to the interface between lung and liver, patient body habitus and motion artifact.  As visualized, there is no abnormal enhancement noted in this portion of the liver and no significant increase in enhancement when compared to other areas of focal fatty sparing.  There is diffuse signal dropout on the out of phase  images most consistent with diffuse fatty infiltration.  The gallbladder is present and unremarkable.  No biliary ductal dilation.    There is normal opacification of the portal vein, IVC and hepatic veins.  The adrenals, pancreas, spleen and kidneys are unremarkable in appearance.  No retroperitoneal adenopathy.  Impression: 1. Findings most consistent with diffuse fatty infiltration with some areas of focal fatty sparing including the nodular area of within the dome which is also favored to represent focal fatty sparing.  Please see limitations above.  2. Remaining findings as discussed above.    Electronically signed by: Jeff Evangelista DO  Date:    08/31/2020  Time:    12:30      Office Spirometry Results:     No flowsheet data found.  Pulmonary Studies Review 9/8/2020   SpO2 98   Height 72   Weight 4343.94   BMI (Calculated) 36.8   Predicted Distance 433.55   Predicted Distance Meters (Calculated) 607.81       Polysomnogram   [facility] Sleep Study Report  Patient Name Kvng Ba Study Ordered by Jose Oden  Date of Night 1 08/19/2020 Date of Birth 1970  Date of Night 2 08/20/2020 Identification Number [id number]  Overall AHI* Overall RDI % time < 90% SpO2 Mean SpO2 % time snoring > 30 dB  77 84 16.1 % 93.7 % 39.1 %  PHYSICIAN INTERPRETATION AND COMMENTS: Findings are consistent with very severe, non-positional obstructive  sleep apnea (RC), with indications of respiratory control instability. Night #2: AHI 83.0/hr with 7.5 hours data. SpO2 mira was  72.9%. Based on the American academy Sleep Medicine practice parameter of CPAP would be the guideline recommendation of  choice. INLAB CPAP titration prefered. Other therapies may include ENT procedures were appropriate, significant weight loss.  Inspire hypoglossal nerve stimulator and nasal PROVENT or mandibular advancement device may also be considered. Close follow  up to ensure resolution of symptoms. Indications of cardiac dysrhythmia are  recorded. Please refer to cardiology  CLINICAL HISTORY: 49 year old male presented with: 18 inch neck, BMI of 31, an Frederica sleepiness score of 10, history of  hypertension, a previous diagnosis of RC and symptoms of nocturnal snoring, waking up choking and witnessed apneas. Based on  the clinical history, the patient has a high pre-test probability of having severe RC. Prior Polysomnography 2006.History of Restless  legs.  SLEEP STUDY FINDINGS: Patient underwent a two night Home Sleep Test and by behavioral criteria, slept for approximately  11.7 hours, with a sleep latency of 4 minutes and a sleep efficiency of 91.2%. Very severe sleep disordered breathing (AHI=77) is  noted based on a 4% hypopnea desaturation criteria, with indications of respiratory control instability. The patient slept supine 56.1%  of the night based on valid recording time of 11.74 hours and is 1.1 times as likely to have apneas/hypopneas when supine. The  apneas/hypopneas are accompanied by severe oxygen desaturation (percent time below 90% SpO2: 16.1%, Min SpO2: 72.9%). The  average desaturation across all sleep disordered breathing events is 8.3%. Snoring occurs for 39.1% (30 dB) of the study, 30.8% is  very loud. The mean pulse rate is 80 BPM, with very frequent pulse rate variability (113 events with >= 6 BPM increase/decrease  per hour).  TREATMENT CONSIDERATIONS: Consider nasal continuous positive airway pressure (CPAP) as the initial treatment  choice for very severe obstructive sleep apnea. Consider an attended CPAP titration study. Presence of respiratory control  instability should be evaluated by a sleep specialist prior to initiating positive pressure treatment. If the patient fails CPAP therapy,  begin supplemental oxygen.  DISEASE MANAGEMENT CONSIDERATIONS: Irregularity of the pulse rate signals indicates possible cardiac  dysrhythmia. If clinically appropriate, further cardiac evaluation is suggested. The previous  diagnosis of narcolepsy may need to  be reevaluated after treatment of very severe RC. Insomnia and morning headaches are symptoms that can be associated  with untreated RC.  Signature: Date:  08-28- 2020  08:40:19  CST  Study Review: The raw data of this DANUTA study has been reviewed, with the report confirmed and electronically signed by Bladimir VERDIN Board Certified Sleep Medicine. Caution:The diagnosis of the Obstructive Sleep Apnea Syndrome must be based on all available clinical  data, of which this study is only a part. Thus final diagnosis and treatment recommendations should include information from an examination      Assessment:            RC (obstructive sleep apnea)  -     CPAP FOR HOME USE  -     CPAP/BIPAP SUPPLIES    Shortness of breath  -     albuterol (PROVENTIL/VENTOLIN HFA) 90 mcg/actuation inhaler; Inhale 2 puffs into the lungs every 4 (four) hours as needed for Wheezing or Shortness of Breath.  Dispense: 18 g; Refill: 11    Asthma with COPD  -     albuterol (PROVENTIL/VENTOLIN HFA) 90 mcg/actuation inhaler; Inhale 2 puffs into the lungs every 4 (four) hours as needed for Wheezing or Shortness of Breath.  Dispense: 18 g; Refill: 11    Essential hypertension  -     metoprolol succinate (TOPROL-XL) 25 MG 24 hr tablet; Take 1 tablet (25 mg total) by mouth once daily.  Dispense: 30 tablet; Refill: 11    Restless legs syndrome (RLS)  -     pramipexole (MIRAPEX) 0.25 MG tablet; Take 1 tablet (0.25 mg total) by mouth every evening. For restless legs  Dispense: 30 tablet; Refill: 11          Outpatient Encounter Medications as of 9/8/2020   Medication Sig Dispense Refill    albuterol (PROVENTIL/VENTOLIN HFA) 90 mcg/actuation inhaler Inhale 2 puffs into the lungs every 4 (four) hours as needed for Wheezing or Shortness of Breath. 18 g 11    avanafil 200 mg Tab Take 200 mg by mouth once daily. 3 tablet 6    [DISCONTINUED] albuterol (PROVENTIL/VENTOLIN HFA) 90 mcg/actuation inhaler Inhale  2 puffs into the lungs every 4 (four) hours as needed for Wheezing or Shortness of Breath. 18 g 11    [DISCONTINUED] ALPRAZolam (XANAX) 0.5 MG tablet Take 1 tablet (0.5 mg total) by mouth On call Procedure for Anxiety. 1 tablet 0    metoprolol succinate (TOPROL-XL) 25 MG 24 hr tablet Take 1 tablet (25 mg total) by mouth once daily. 30 tablet 11    pramipexole (MIRAPEX) 0.25 MG tablet Take 1 tablet (0.25 mg total) by mouth every evening. For restless legs 30 tablet 11    [DISCONTINUED] metoprolol succinate (TOPROL-XL) 25 MG 24 hr tablet Take 1 tablet (25 mg total) by mouth once daily. (Patient not taking: Reported on 8/12/2020) 30 tablet 11     No facility-administered encounter medications on file as of 9/8/2020.      Plan:       Requested Prescriptions     Signed Prescriptions Disp Refills    albuterol (PROVENTIL/VENTOLIN HFA) 90 mcg/actuation inhaler 18 g 11     Sig: Inhale 2 puffs into the lungs every 4 (four) hours as needed for Wheezing or Shortness of Breath.    metoprolol succinate (TOPROL-XL) 25 MG 24 hr tablet 30 tablet 11     Sig: Take 1 tablet (25 mg total) by mouth once daily.    pramipexole (MIRAPEX) 0.25 MG tablet 30 tablet 11     Sig: Take 1 tablet (0.25 mg total) by mouth every evening. For restless legs     Problem List Items Addressed This Visit     Asthma with COPD    Relevant Medications    albuterol (PROVENTIL/VENTOLIN HFA) 90 mcg/actuation inhaler    Essential hypertension    Relevant Medications    metoprolol succinate (TOPROL-XL) 25 MG 24 hr tablet    RC (obstructive sleep apnea) - Primary    Relevant Orders    CPAP FOR HOME USE    CPAP/BIPAP SUPPLIES      Other Visit Diagnoses     Shortness of breath        Relevant Medications    albuterol (PROVENTIL/VENTOLIN HFA) 90 mcg/actuation inhaler    Restless legs syndrome (RLS)        Relevant Medications    pramipexole (MIRAPEX) 0.25 MG tablet             Follow up in about 6 weeks (around 10/20/2020) for CPAP download - review on day of  visit.    MEDICAL DECISION MAKING: Moderate to high complexity.  Overall, the multiple problems listed are of moderate to high severity that may impact quality of life and activities of daily living. Side effects of medications, treatment plan as well as options and alternatives reviewed and discussed with patient. There was counseling of patient concerning these issues.    Total time spent in face to face counseling and coordination of care - 30  minutes over 50% of time was used in discussion of prognosis, risks, benefits of treatment, instructions and compliance with regimen . Discussion with other physicians or health care providers (DME, NP, pharmacy, respiratory therapy) occurred.

## 2020-09-08 NOTE — PATIENT INSTRUCTIONS
CPAP HABITUATION PROCEDURE    Charles Prado, Ph.D., [facility] and Jose Oden M.D.  [facility], [facility]    Some people have difficulty adjusting to CPAP/BiPAP/AutoCPAP.  This is not unusual or hard to understand: Breathing with CPAP is different from ordinary breathing, and this difference is aversive to some. The problem can be overcome, however, and the benefits CPAP confers are certainly worth the effort.  Below, you will find a simple and gradual way to get used to CPAP before you try to use it all night, every night.  The essence of this procedure is to relax and let breathing with CPAP become a habit.  It may take about 2 weeks, and involves the followin. CPAP while awake and comfortably seated, during the late evening.     2. CPAP in bed while attempting sleep at night.     3. If your discomfort is too great at any time, discontinue and attempt again later the same night, for the same amount of time.   4. You and your physician may alter the times and pressures if necessary.     5. If you find that it is very easy to get used to CPAP, you may start using it every night when you are comfortable enough to do so.  6. IMPORTANT REMINDER: If you have a cold or sinus congestion it is okay to miss a night or two of CPAP. Consider using antihistamines or decongestants to clear up your sinus congestion prior to sleeping.    DAYS  1-3   Start CPAP while awake and comfortably seated during the late evening, after having prepared for bed.  You may do this while watching television, listening to music or reading. Use for 1 hour, then take off CPAP and go directly to bed to sleep    DAYS  4-6   Start CPAP when you go to bed and use for 1 hour, or until you fall asleep.  If your discomfort is too great at any time, discontinue and attempt again later the same night, for the same designated amount of time (1 hour).     DAYS  7-9   Increase time with CPAP to 2 hours a  "night.  If your discomfort is too great at any time, discontinue and attempt again later the same night, for the same designated amount of time (2 hours).     DAYS 10-12  Increase time with CPAP to 3 hours a night. If your discomfort is too great at any time, discontinue and attempt again later the same night, for the same designated amount of time (3 hours).     DAYS 13-15   Sleep the entire night with CPAP.     OPTIONAL: You may use Progressive Muscle Relaxation (PMR) to help put you at ease when using CPAP; do PMR twice each day, once in the morning or afternoon, and once in the evening just before using CPAP. You may do PMR prior to any attempt until you are comfortable with CPAP.      \  Continuous Positive Airway Pressure Machine Cleaning    One of the most important factors in maintaining CPAP compliance is taking proper care of your CPAP equipment. In order to have successful CPAP therapy, you must be willing to make your treatment a priority in your life, and that means regularly cleaning and maintaining your CPAP equipment. Fortunately, taking proper care of your equipment is pretty easy, and not very time consuming. With a little adjustment to your regular morning routine, your device and accessories will be working at 100% efficiency to get you that much needed sleep you've been longing for.    One of the most frequent questions we get asked is "how often do I need to clean my CPAP equipment?" .    CPAP Humidifier Cleaning and Replacement:  Nearly all current CPAP machines now come with a heated humidification system that helps cut down on morning dry mouth as well as keeping your nasal turbinates from drying out and becoming irritated and inflamed. However, the humidification chamber needs to be cleaned out daily to prevent bacteria build-up as well as calcification. Recommended routine:    Remove chamber from humidifier carefully so water doesn't enter your CPAP machine.    Open chamber and wash with " warm, soapy water.    Rinse well with water and allow to dry on a clean cloth or paper towel out in direct sunlight.    Fill with distilled or sterile water (obtained at any grocery store) Do not use tap water as it may contain minerals and chemicals that can damage components of the machine. It is also not recommended to use filtered water (i.e. through a Jesika filter) for the same reasons.    Once a week the humidifier chamber should be soaked in a solution of 1 part white vinegar 3 parts water for approximately 15-20 minutes before rinsing thoroughly with distilled water.    Some humidifier chambers are  safe, but make sure to check your CPAP machine's manual before cleaning in a .    Humidifier chambers should be replaced as needed.    CPAP Mask Cleaning and Replacement:  Most CPAP mask cushions are made of silicone, a gentle, non-irritating material. However, while silicone is a very comfortable material for masks, it doesn't have a very long lifespan, and without proper care can breakdown quicker than expected. The oil from your skin interacts with the silicone mask and causes breakdown and stiffness. Therefore, cleaning your CPAP mask is crucial in making it efficient as possible. Here's some tips on CPAP mask cleaning and replacement:    Wash mask daily with warm water and mild, non-fragrant soap or purchase CPAP mask specific wipes and detergents. You can use the same type of mild basic facial soap that you use on your face.    Rinse with water and allow to air dry on a clean cloth or paper towel out of direct sunlight.    Before using mask at night, wash your face thoroughly and don't use facial moisturizers. Facial oils and moisturizers can breakdown the silicone faster.    Once a week soak mask in solution of 1 part white vinegar 3 parts water before rinsing in distilled water.    Headgear and chinstraps should be washed as needed by hand using warm soapy water, rinsed well, and air  dried. Do not place headgear or chinstraps in washing machine or dryer.  For replacement schedules of CPAP masks you should check both your 's recommendations and your insurance allowance. However, for most masks it is recommended that you  replace the cushions 1-2 times per month, and the mask every 3-6 months.    CPAP tubing should be cleaned weekly in a sink of warm, soapy water, rinsed well, and left to hang-dry out of direct sunlight.    CPAP Filters Cleaning and Replacement  Your filters are located near the back of the CPAP machine where the device draws air from the room that it compresses to your pressure settings. Nearly all CPAP machines have a disposable white paper filter and some have an additional non-disposable grey filter as well. Here are some cleaning tips for your CPAP filters:    You should clean the grey non-disposable filter at least on a weekly basis. You may have to clean it more regularly if you have pets, smoke inside your house, or if your home is especially hemanth.    Rinse grey filters with water and allow to dry before placing back into your machine.    The grey re-usable filters should be replaced when it begins to look worn or after 6 months.    Replace disposable white paper filters monthly or more frequently if it appears dingy or dirty.    Your CPAP machine itself does not need to be cleaned but you may want to dust it down with a slightly damp cloth as desired.    General CPAP Maintenance & CPAP Cleaning Tips  Make your CPAP equipment cleaning part of your morning routine, allowing the equipment ample time to dry during the day.    Keep machine and accessories out of direct sunlight to avoid damaging them.    Never use bleach to clean accessories.    Other machine accessories such as power cords and data cards may need to be replaced due to equipment malfunctions.    Place machine on a level surface away from objects such as curtains that may interfere with the air  intake.    Always use distilled or sterile water when cleaning components.    Keep track of when you should order replacement parts for your mask and accessories so that you always get the most out of your therapy.    With these simple tips on cleaning and maintaining your CPAP device and accessories, you will assuredly have a much better CPAP therapy experience.    There are a number of machines advertised that clean Continuous Positive Airway Pressure equipment. For most patients this is not necessary. If you have a history of chronic sinus or lung infections this type of cleaning device may be helpful. Unfortunately, at this time most insurance companies and Medicare are not paying for these devices.

## 2020-09-09 PROBLEM — J44.89 ASTHMA WITH COPD: Status: ACTIVE | Noted: 2020-09-09

## 2020-09-09 PROBLEM — I10 ESSENTIAL HYPERTENSION: Status: ACTIVE | Noted: 2020-09-09

## 2020-09-09 PROBLEM — G47.33 OSA (OBSTRUCTIVE SLEEP APNEA): Status: ACTIVE | Noted: 2020-09-09

## 2021-04-28 ENCOUNTER — PATIENT MESSAGE (OUTPATIENT)
Dept: RESEARCH | Facility: HOSPITAL | Age: 51
End: 2021-04-28

## 2023-05-04 DIAGNOSIS — J44.89 ASTHMA WITH COPD: Primary | ICD-10-CM

## 2023-05-11 ENCOUNTER — HOSPITAL ENCOUNTER (OUTPATIENT)
Dept: RADIOLOGY | Facility: HOSPITAL | Age: 53
Discharge: HOME | End: 2023-05-11
Attending: INTERNAL MEDICINE
Payer: COMMERCIAL

## 2023-05-11 ENCOUNTER — OFFICE VISIT (OUTPATIENT)
Dept: PULMONOLOGY | Facility: CLINIC | Age: 53
End: 2023-05-11
Payer: COMMERCIAL

## 2023-05-11 VITALS
WEIGHT: 288.13 LBS | RESPIRATION RATE: 18 BRPM | DIASTOLIC BLOOD PRESSURE: 90 MMHG | SYSTOLIC BLOOD PRESSURE: 140 MMHG | OXYGEN SATURATION: 95 % | HEIGHT: 72 IN | BODY MASS INDEX: 39.02 KG/M2 | HEART RATE: 80 BPM

## 2023-05-11 DIAGNOSIS — I10 ESSENTIAL HYPERTENSION: ICD-10-CM

## 2023-05-11 DIAGNOSIS — N52.8 OTHER MALE ERECTILE DYSFUNCTION: ICD-10-CM

## 2023-05-11 DIAGNOSIS — G47.33 OSA (OBSTRUCTIVE SLEEP APNEA): ICD-10-CM

## 2023-05-11 DIAGNOSIS — R06.02 SHORTNESS OF BREATH: ICD-10-CM

## 2023-05-11 DIAGNOSIS — J44.89 ASTHMA WITH COPD: ICD-10-CM

## 2023-05-11 DIAGNOSIS — J44.89 ASTHMA WITH COPD: Primary | ICD-10-CM

## 2023-05-11 PROCEDURE — 99999 PR PBB SHADOW E&M-EST. PATIENT-LVL III: CPT | Mod: PBBFAC,,, | Performed by: INTERNAL MEDICINE

## 2023-05-11 PROCEDURE — 99214 PR OFFICE/OUTPT VISIT, EST, LEVL IV, 30-39 MIN: ICD-10-PCS | Mod: S$GLB,,, | Performed by: INTERNAL MEDICINE

## 2023-05-11 PROCEDURE — 3008F BODY MASS INDEX DOCD: CPT | Mod: CPTII,S$GLB,, | Performed by: INTERNAL MEDICINE

## 2023-05-11 PROCEDURE — 3008F PR BODY MASS INDEX (BMI) DOCUMENTED: ICD-10-PCS | Mod: CPTII,S$GLB,, | Performed by: INTERNAL MEDICINE

## 2023-05-11 PROCEDURE — 99999 PR PBB SHADOW E&M-EST. PATIENT-LVL III: ICD-10-PCS | Mod: PBBFAC,,, | Performed by: INTERNAL MEDICINE

## 2023-05-11 PROCEDURE — 71046 X-RAY EXAM CHEST 2 VIEWS: CPT | Mod: 26,,, | Performed by: RADIOLOGY

## 2023-05-11 PROCEDURE — 3080F DIAST BP >= 90 MM HG: CPT | Mod: CPTII,S$GLB,, | Performed by: INTERNAL MEDICINE

## 2023-05-11 PROCEDURE — 71046 X-RAY EXAM CHEST 2 VIEWS: CPT | Mod: TC

## 2023-05-11 PROCEDURE — 99214 OFFICE O/P EST MOD 30 MIN: CPT | Mod: S$GLB,,, | Performed by: INTERNAL MEDICINE

## 2023-05-11 PROCEDURE — 3077F SYST BP >= 140 MM HG: CPT | Mod: CPTII,S$GLB,, | Performed by: INTERNAL MEDICINE

## 2023-05-11 PROCEDURE — 71046 XR CHEST PA AND LATERAL: ICD-10-PCS | Mod: 26,,, | Performed by: RADIOLOGY

## 2023-05-11 PROCEDURE — 1159F MED LIST DOCD IN RCRD: CPT | Mod: CPTII,S$GLB,, | Performed by: INTERNAL MEDICINE

## 2023-05-11 PROCEDURE — 1159F PR MEDICATION LIST DOCUMENTED IN MEDICAL RECORD: ICD-10-PCS | Mod: CPTII,S$GLB,, | Performed by: INTERNAL MEDICINE

## 2023-05-11 PROCEDURE — 3080F PR MOST RECENT DIASTOLIC BLOOD PRESSURE >= 90 MM HG: ICD-10-PCS | Mod: CPTII,S$GLB,, | Performed by: INTERNAL MEDICINE

## 2023-05-11 PROCEDURE — 3077F PR MOST RECENT SYSTOLIC BLOOD PRESSURE >= 140 MM HG: ICD-10-PCS | Mod: CPTII,S$GLB,, | Performed by: INTERNAL MEDICINE

## 2023-05-11 RX ORDER — METOPROLOL SUCCINATE 25 MG/1
25 TABLET, EXTENDED RELEASE ORAL DAILY
Qty: 30 TABLET | Refills: 11 | Status: SHIPPED | OUTPATIENT
Start: 2023-05-11 | End: 2023-07-27

## 2023-05-11 RX ORDER — ALBUTEROL SULFATE 90 UG/1
2 AEROSOL, METERED RESPIRATORY (INHALATION) EVERY 4 HOURS PRN
Qty: 18 G | Refills: 11 | Status: SHIPPED | OUTPATIENT
Start: 2023-05-11

## 2023-05-11 NOTE — PROGRESS NOTES
Subjective:     Patient ID: Kvng Ba Jr. is a 52 y.o. male.    Chief Complaint:  weight gain     HPI    COPD  He presents for evaluation and treatment of Asthma/COPD. The patient is not currently having symptoms / an exacerbation. Current symptoms include acute dyspnea, chronic dyspnea, dyspnea after 2 blocks and dyspnea after 2 flights of stairs. Symptoms have been present since several months ago and have been gradually worsening. He denies chills and fever. Associated symptoms include chest pain, poor exercise tolerance and shortness of breath.  This episode appears to have been triggered by no identifiable factor. Treatments tried for the current exacerbation: none. The patient has been having similar episodes for approximately 3 years. He uses 2 pillows at night. Patient currently is not on home oxygen therapy.. The patient is having no constitutional symptoms, denying fever, chills, anorexia, or weight loss. The patient has not been hospitalized for this condition before. He has a history of 35 pack years. The patient is experiencing exercise intolerance (difficulty climbing 3 flights of stairs).     Insomnia and Obstructive Sleep Apnea with GERD         Sleep Apnea  He presents for a sleep evaluation. He complains of snoring, periods of not breathing, decreased memory, decreased concentration, excessive daytime sleepiness, falling asleep while driving, reading, watching television, feels sleepy during the day, take naps during the day.  Symptoms began 15 years years ago, gradually worsening since that time.  He goes to sleep at 3 - am weekdays and  weekends. He awakens 7  weekdays and  weekends. He falls asleep in 5 minutes.  Collar size na. He denies knees buckling with laughing, completely or partially paralyzed while falling asleep or waking up. Previous evaluation and treatment has included PSG. EPWORTH 16     Hx of restless legs  Hx of polysomnogram in 2006 -  At [facility]  -   Leg and back problems    HBP : needs refills       Past Medical History:   Diagnosis Date    ADHD (attention deficit hyperactivity disorder)     Anxiety     Chronic back pain     DJD (degenerative joint disease) of lumbar spine     GERD (gastroesophageal reflux disease)     High cholesterol      Past Surgical History:   Procedure Laterality Date    ARTHROPLASTY      R knee    FEMUR FRACTURE SURGERY      jose in place    LUMBAR DISCECTOMY      L4-L5    LUMBAR FUSION      L4-L5     Review of patient's allergies indicates:  No Known Allergies  Current Outpatient Medications on File Prior to Visit   Medication Sig Dispense Refill    [DISCONTINUED] albuterol (PROVENTIL/VENTOLIN HFA) 90 mcg/actuation inhaler Inhale 2 puffs into the lungs every 4 (four) hours as needed for Wheezing or Shortness of Breath. (Patient not taking: Reported on 2023) 18 g 11    [DISCONTINUED] avanafil 200 mg Tab Take 200 mg by mouth once daily. (Patient not taking: Reported on 2023) 3 tablet 6    [DISCONTINUED] metoprolol succinate (TOPROL-XL) 25 MG 24 hr tablet Take 1 tablet (25 mg total) by mouth once daily. (Patient not taking: Reported on 2023) 30 tablet 11    [DISCONTINUED] pramipexole (MIRAPEX) 0.25 MG tablet Take 1 tablet (0.25 mg total) by mouth every evening. For restless legs (Patient not taking: Reported on 2023) 30 tablet 11     No current facility-administered medications on file prior to visit.     Social History     Socioeconomic History    Marital status:    Tobacco Use    Smoking status: Former     Packs/day: 1.00     Years: 35.00     Pack years: 35.00     Types: Cigarettes     Start date: 1985     Quit date: 2020     Years since quittin.9    Smokeless tobacco: Never    Tobacco comments:     Quit 2020   Substance and Sexual Activity    Alcohol use: Yes     Comment: weekly    Drug use: No    Sexual activity: Yes     Family History   Problem Relation Age of Onset    Hypertension  Mother     Heart disease Mother     Cancer Mother     Hypertension Father     Cancer Father        Review of Systems   Constitutional:  Positive for fatigue. Negative for fever.   HENT:  Positive for congestion. Negative for postnasal drip and rhinorrhea.    Eyes:  Negative for redness and itching.   Respiratory:  Positive for cough and dyspnea on extertion. Negative for sputum production, shortness of breath, use of rescue inhaler and Paroxysmal Nocturnal Dyspnea.    Cardiovascular:  Negative for chest pain, palpitations and leg swelling.   Genitourinary:  Negative for difficulty urinating and hematuria.   Endocrine:  Negative for cold intolerance and heat intolerance.    Musculoskeletal:  Positive for arthralgias.   Skin:  Negative for rash.   Gastrointestinal:  Negative for nausea and abdominal pain.   Neurological:  Negative for dizziness, syncope, weakness and light-headedness.   Hematological:  Negative for adenopathy. Does not bruise/bleed easily.   Psychiatric/Behavioral:  Negative for sleep disturbance. The patient is not nervous/anxious.      Objective:      BP (!) 140/90   Pulse 80   Resp 18   Ht 6' (1.829 m)   Wt 130.7 kg (288 lb 2.3 oz)   SpO2 95%   BMI 39.08 kg/m²   Physical Exam  Vitals and nursing note reviewed.   Constitutional:       Appearance: He is well-developed. He is obese.   HENT:      Head: Normocephalic and atraumatic.      Nose: Nose normal.   Eyes:      Conjunctiva/sclera: Conjunctivae normal.      Pupils: Pupils are equal, round, and reactive to light.   Neck:      Thyroid: No thyromegaly.      Vascular: No JVD.      Trachea: No tracheal deviation.   Cardiovascular:      Rate and Rhythm: Normal rate and regular rhythm.      Heart sounds: Normal heart sounds.   Pulmonary:      Effort: Pulmonary effort is normal.      Breath sounds: Normal breath sounds.   Abdominal:      Palpations: Abdomen is soft.   Musculoskeletal:         General: Normal range of motion.      Cervical back:  Neck supple.   Lymphadenopathy:      Cervical: No cervical adenopathy.   Skin:     General: Skin is warm and dry.   Neurological:      Mental Status: He is alert and oriented to person, place, and time.   Psychiatric:         Mood and Affect: Mood normal.         Behavior: Behavior normal.     Personal Diagnostic Review  Chest x-ray: WNL     Pulmonary Studies Review 5/11/2023   SpO2 95   Height 72   Weight 4610.26   BMI (Calculated) 39.1   Predicted Distance 406.77   Predicted Distance Meters (Calculated) 584.48       X-Ray Chest PA And Lateral  Narrative: EXAM:  XR CHEST PA AND LATERAL    CLINICAL HISTORY: COPD    COMPARISON: 08/18/2020    FINDINGS: No confluent airspace opacity or consolidation.  There is no evidence of pleural effusion, pneumothorax, or other acute pulmonary disease.  The cardiomediastinal silhouette is within normal limits.  No acute osseous abnormality is evident.  Impression:  No acute cardiopulmonary abnormality.    Finalized on: 5/11/2023 1:31 PM By:  Frank Quevedo MD  [facility]# [id number]      2023-05-11 13:33:31.200    [facility]      Office Spirometry Results:     No flowsheet data found.  Pulmonary Studies Review 5/11/2023   SpO2 95   Height 72   Weight 4610.26   BMI (Calculated) 39.1   Predicted Distance 406.77   Predicted Distance Meters (Calculated) 584.48         Assessment:            Asthma with COPD  -     albuterol (PROVENTIL/VENTOLIN HFA) 90 mcg/actuation inhaler; Inhale 2 puffs into the lungs every 4 (four) hours as needed for Wheezing or Shortness of Breath.  Dispense: 18 g; Refill: 11  -     Spirometry with/without bronchodilator; Future; Expected date: 11/11/2023    Essential hypertension  -     metoprolol succinate (TOPROL-XL) 25 MG 24 hr tablet; Take 1 tablet (25 mg total) by mouth once daily.  Dispense: 30 tablet; Refill: 11    RC (obstructive sleep apnea)  -     CPAP/BIPAP SUPPLIES    Other male erectile dysfunction  -     avanafiL (STENDRA) 200 mg Tab; Take 200 mg by mouth once  daily.  Dispense: 3 tablet; Refill: 6    Shortness of breath  -     albuterol (PROVENTIL/VENTOLIN HFA) 90 mcg/actuation inhaler; Inhale 2 puffs into the lungs every 4 (four) hours as needed for Wheezing or Shortness of Breath.  Dispense: 18 g; Refill: 11          Outpatient Encounter Medications as of 5/11/2023   Medication Sig Dispense Refill    albuterol (PROVENTIL/VENTOLIN HFA) 90 mcg/actuation inhaler Inhale 2 puffs into the lungs every 4 (four) hours as needed for Wheezing or Shortness of Breath. 18 g 11    avanafiL (STENDRA) 200 mg Tab Take 200 mg by mouth once daily. 3 tablet 6    metoprolol succinate (TOPROL-XL) 25 MG 24 hr tablet Take 1 tablet (25 mg total) by mouth once daily. 30 tablet 11    [DISCONTINUED] albuterol (PROVENTIL/VENTOLIN HFA) 90 mcg/actuation inhaler Inhale 2 puffs into the lungs every 4 (four) hours as needed for Wheezing or Shortness of Breath. (Patient not taking: Reported on 5/11/2023) 18 g 11    [DISCONTINUED] avanafil 200 mg Tab Take 200 mg by mouth once daily. (Patient not taking: Reported on 5/11/2023) 3 tablet 6    [DISCONTINUED] metoprolol succinate (TOPROL-XL) 25 MG 24 hr tablet Take 1 tablet (25 mg total) by mouth once daily. (Patient not taking: Reported on 5/11/2023) 30 tablet 11    [DISCONTINUED] pramipexole (MIRAPEX) 0.25 MG tablet Take 1 tablet (0.25 mg total) by mouth every evening. For restless legs (Patient not taking: Reported on 5/11/2023) 30 tablet 11     No facility-administered encounter medications on file as of 5/11/2023.     Plan:       Requested Prescriptions     Signed Prescriptions Disp Refills    metoprolol succinate (TOPROL-XL) 25 MG 24 hr tablet 30 tablet 11     Sig: Take 1 tablet (25 mg total) by mouth once daily.    avanafiL (STENDRA) 200 mg Tab 3 tablet 6     Sig: Take 200 mg by mouth once daily.    albuterol (PROVENTIL/VENTOLIN HFA) 90 mcg/actuation inhaler 18 g 11     Sig: Inhale 2 puffs into the lungs every 4 (four) hours as needed for Wheezing or  Shortness of Breath.     Problem List Items Addressed This Visit       Asthma with COPD - Primary    Relevant Medications    albuterol (PROVENTIL/VENTOLIN HFA) 90 mcg/actuation inhaler    Other Relevant Orders    Spirometry with/without bronchodilator    Essential hypertension    Relevant Medications    metoprolol succinate (TOPROL-XL) 25 MG 24 hr tablet    RC (obstructive sleep apnea)    Relevant Orders    CPAP/BIPAP SUPPLIES    Shortness of breath    Relevant Medications    albuterol (PROVENTIL/VENTOLIN HFA) 90 mcg/actuation inhaler     Other Visit Diagnoses       Other male erectile dysfunction        Relevant Medications    avanafiL (STENDRA) 200 mg Tab               Follow up in about 6 months (around 11/11/2023) for Review grey - on return.    MEDICAL DECISION MAKING: Moderate to high complexity.  Overall, the multiple problems listed are of moderate to high severity that may impact quality of life and activities of daily living. Side effects of medications, treatment plan as well as options and alternatives reviewed and discussed with patient. There was counseling of patient concerning these issues.    Total time spent in counseling and coordination of care - 45  minutes of total time spent on the encounter, which includes face to face time and non-face to face time preparing to see the patient (eg, review of tests), Obtaining and/or reviewing separately obtained history, Documenting clinical information in the electronic or other health record, Independently interpreting results (not separately reported) and communicating results to the patient/family/caregiver, or Care coordination (not separately reported).    Time was used in discussion of prognosis, risks, benefits of treatment, instructions and compliance with regimen . Discussion with other physicians and/or health care providers - home health or for use of durable medical equipment (oxygen, nebulizers, CPAP, BiPAP) occurred.

## 2023-07-27 ENCOUNTER — OFFICE VISIT (OUTPATIENT)
Dept: INTERNAL MEDICINE | Facility: CLINIC | Age: 53
End: 2023-07-27
Payer: COMMERCIAL

## 2023-07-27 ENCOUNTER — LAB VISIT (OUTPATIENT)
Dept: LAB | Facility: HOSPITAL | Age: 53
End: 2023-07-27
Attending: FAMILY MEDICINE
Payer: COMMERCIAL

## 2023-07-27 VITALS
WEIGHT: 286.38 LBS | SYSTOLIC BLOOD PRESSURE: 144 MMHG | HEART RATE: 108 BPM | DIASTOLIC BLOOD PRESSURE: 96 MMHG | TEMPERATURE: 97 F | BODY MASS INDEX: 38.79 KG/M2 | OXYGEN SATURATION: 96 % | HEIGHT: 72 IN

## 2023-07-27 DIAGNOSIS — Z12.5 SCREENING FOR PROSTATE CANCER: ICD-10-CM

## 2023-07-27 DIAGNOSIS — I10 PRIMARY HYPERTENSION: Chronic | ICD-10-CM

## 2023-07-27 DIAGNOSIS — Z00.00 ANNUAL PHYSICAL EXAM: Primary | ICD-10-CM

## 2023-07-27 DIAGNOSIS — Z12.2 SCREENING FOR LUNG CANCER: ICD-10-CM

## 2023-07-27 DIAGNOSIS — Z12.11 SCREENING FOR COLON CANCER: ICD-10-CM

## 2023-07-27 DIAGNOSIS — Z87.891 FORMER SMOKER: ICD-10-CM

## 2023-07-27 DIAGNOSIS — Z00.00 ANNUAL PHYSICAL EXAM: ICD-10-CM

## 2023-07-27 DIAGNOSIS — G47.33 OSA (OBSTRUCTIVE SLEEP APNEA): ICD-10-CM

## 2023-07-27 DIAGNOSIS — E66.01 SEVERE OBESITY (BMI 35.0-39.9) WITH COMORBIDITY: ICD-10-CM

## 2023-07-27 DIAGNOSIS — Z11.4 SCREENING FOR HIV (HUMAN IMMUNODEFICIENCY VIRUS): ICD-10-CM

## 2023-07-27 DIAGNOSIS — Z11.59 NEED FOR HEPATITIS C SCREENING TEST: ICD-10-CM

## 2023-07-27 DIAGNOSIS — J44.89 ASTHMA WITH COPD: Chronic | ICD-10-CM

## 2023-07-27 PROBLEM — R06.02 SHORTNESS OF BREATH: Status: RESOLVED | Noted: 2023-05-11 | Resolved: 2023-07-27

## 2023-07-27 LAB
ERYTHROCYTE [DISTWIDTH] IN BLOOD BY AUTOMATED COUNT: 14.1 % (ref 11.5–14.5)
ESTIMATED AVG GLUCOSE: 126 MG/DL (ref 68–131)
HBA1C MFR BLD: 6 % (ref 4–5.6)
HCT VFR BLD AUTO: 45.6 % (ref 40–54)
HGB BLD-MCNC: 14.5 G/DL (ref 14–18)
MCH RBC QN AUTO: 28.4 PG (ref 27–31)
MCHC RBC AUTO-ENTMCNC: 31.8 G/DL (ref 32–36)
MCV RBC AUTO: 89 FL (ref 82–98)
PLATELET # BLD AUTO: 275 K/UL (ref 150–450)
PMV BLD AUTO: 11 FL (ref 9.2–12.9)
RBC # BLD AUTO: 5.11 M/UL (ref 4.6–6.2)
WBC # BLD AUTO: 11.59 K/UL (ref 3.9–12.7)

## 2023-07-27 PROCEDURE — 84443 ASSAY THYROID STIM HORMONE: CPT | Performed by: FAMILY MEDICINE

## 2023-07-27 PROCEDURE — 99386 PR PREVENTIVE VISIT,NEW,40-64: ICD-10-PCS | Mod: S$GLB,,, | Performed by: FAMILY MEDICINE

## 2023-07-27 PROCEDURE — 1160F RVW MEDS BY RX/DR IN RCRD: CPT | Mod: CPTII,S$GLB,, | Performed by: FAMILY MEDICINE

## 2023-07-27 PROCEDURE — 3008F BODY MASS INDEX DOCD: CPT | Mod: CPTII,S$GLB,, | Performed by: FAMILY MEDICINE

## 2023-07-27 PROCEDURE — 1160F PR REVIEW ALL MEDS BY PRESCRIBER/CLIN PHARMACIST DOCUMENTED: ICD-10-PCS | Mod: CPTII,S$GLB,, | Performed by: FAMILY MEDICINE

## 2023-07-27 PROCEDURE — 1159F MED LIST DOCD IN RCRD: CPT | Mod: CPTII,S$GLB,, | Performed by: FAMILY MEDICINE

## 2023-07-27 PROCEDURE — 3080F PR MOST RECENT DIASTOLIC BLOOD PRESSURE >= 90 MM HG: ICD-10-PCS | Mod: CPTII,S$GLB,, | Performed by: FAMILY MEDICINE

## 2023-07-27 PROCEDURE — 80061 LIPID PANEL: CPT | Performed by: FAMILY MEDICINE

## 2023-07-27 PROCEDURE — 83036 HEMOGLOBIN GLYCOSYLATED A1C: CPT | Performed by: FAMILY MEDICINE

## 2023-07-27 PROCEDURE — 82040 ASSAY OF SERUM ALBUMIN: CPT | Mod: 91 | Performed by: FAMILY MEDICINE

## 2023-07-27 PROCEDURE — 3080F DIAST BP >= 90 MM HG: CPT | Mod: CPTII,S$GLB,, | Performed by: FAMILY MEDICINE

## 2023-07-27 PROCEDURE — 36415 COLL VENOUS BLD VENIPUNCTURE: CPT | Performed by: FAMILY MEDICINE

## 2023-07-27 PROCEDURE — 99999 PR PBB SHADOW E&M-EST. PATIENT-LVL IV: ICD-10-PCS | Mod: PBBFAC,,, | Performed by: FAMILY MEDICINE

## 2023-07-27 PROCEDURE — 3008F PR BODY MASS INDEX (BMI) DOCUMENTED: ICD-10-PCS | Mod: CPTII,S$GLB,, | Performed by: FAMILY MEDICINE

## 2023-07-27 PROCEDURE — 3077F SYST BP >= 140 MM HG: CPT | Mod: CPTII,S$GLB,, | Performed by: FAMILY MEDICINE

## 2023-07-27 PROCEDURE — 1159F PR MEDICATION LIST DOCUMENTED IN MEDICAL RECORD: ICD-10-PCS | Mod: CPTII,S$GLB,, | Performed by: FAMILY MEDICINE

## 2023-07-27 PROCEDURE — 3077F PR MOST RECENT SYSTOLIC BLOOD PRESSURE >= 140 MM HG: ICD-10-PCS | Mod: CPTII,S$GLB,, | Performed by: FAMILY MEDICINE

## 2023-07-27 PROCEDURE — 85027 COMPLETE CBC AUTOMATED: CPT | Performed by: FAMILY MEDICINE

## 2023-07-27 PROCEDURE — 87389 HIV-1 AG W/HIV-1&-2 AB AG IA: CPT | Performed by: FAMILY MEDICINE

## 2023-07-27 PROCEDURE — 84153 ASSAY OF PSA TOTAL: CPT | Performed by: FAMILY MEDICINE

## 2023-07-27 PROCEDURE — 99386 PREV VISIT NEW AGE 40-64: CPT | Mod: S$GLB,,, | Performed by: FAMILY MEDICINE

## 2023-07-27 PROCEDURE — 80053 COMPREHEN METABOLIC PANEL: CPT | Performed by: FAMILY MEDICINE

## 2023-07-27 PROCEDURE — 99999 PR PBB SHADOW E&M-EST. PATIENT-LVL IV: CPT | Mod: PBBFAC,,, | Performed by: FAMILY MEDICINE

## 2023-07-27 PROCEDURE — 86803 HEPATITIS C AB TEST: CPT | Performed by: FAMILY MEDICINE

## 2023-07-27 RX ORDER — OLMESARTAN MEDOXOMIL 20 MG/1
20 TABLET ORAL DAILY
Qty: 90 TABLET | Refills: 0 | Status: SHIPPED | OUTPATIENT
Start: 2023-07-27 | End: 2023-11-13 | Stop reason: SDUPTHER

## 2023-07-27 NOTE — PROGRESS NOTES
"Subjective:   Patient ID: Kvng Ba Jr. is a 52 y.o. male.  Chief Complaint:  Establish Care and Hypertension    Presents to establish care and follow-up on hypertension  Sees [facility] pulmonology     Medical history:  -hypertension.  Previously on Toprol-XL 25 mg daily.  Recently restarted by pulmonology.  Advised to establish with PCP who might prescribed "better medication for him".  Reports compliance.  Denies side effects.  Denies any chest pain, shortness in breath unrelated to intermittent asthma or claudication.  -asthma/COPD.  Albuterol p.r.n..  Followed by pulmonology.    -ED.  On standard 2 mg daily as needed.  Reports effective.  No side effects.    -RC on CPAP.  Reports compliance with medical device.    -severe obesity lb when quit smoking.  Interested in assistance with weight loss.    -former smoker.  Quit 3 years ago.  No previous low-dose CT lung cancer screening on file.  Did 20 pack-year history    Health maintenance.    - No previous colon cancer screening   - No previous lung cancer screening   - Tetanus booster up-to-date   - No previous pneumonia vaccine   - No previous shingles vaccines  - Completed primary COVID vaccine series but no booster  - No recent flu vaccine.    Other than obtaining adequate blood pressure control and having annual lab work checked, no other significant complaints or concerns today      Current Outpatient Medications:     albuterol (PROVENTIL/VENTOLIN HFA) 90 mcg/actuation inhaler, Inhale 2 puffs into the lungs every 4 (four) hours as needed for Wheezing or Shortness of Breath., Disp: 18 g, Rfl: 11    avanafiL (STENDRA) 200 mg Tab, Take 200 mg by mouth daily as needed (sexual activity)., Disp: 6 tablet, Rfl: 11    olmesartan (BENICAR) 20 MG tablet, Take 1 tablet (20 mg total) by mouth once daily., Disp: 90 tablet, Rfl: 0    Review of Systems   Constitutional:  Negative for fatigue.   Eyes:  Negative for visual disturbance.   Respiratory:  Negative for " cough, chest tightness and shortness of breath.    Cardiovascular:  Negative for chest pain, palpitations and leg swelling.   Gastrointestinal:  Negative for abdominal pain, diarrhea, nausea and vomiting.   Genitourinary:  Negative for difficulty urinating.   Musculoskeletal:  Negative for myalgias and neck pain.   Skin:  Negative for rash.   Neurological:  Negative for dizziness, syncope, weakness, light-headedness and headaches.   Psychiatric/Behavioral:  Negative for sleep disturbance.      Objective:   BP (!) 144/96 (BP Location: Left arm, Patient Position: Sitting, BP Method: Large (Manual))   Pulse 108   Temp 97.2 °F (36.2 °C) (Tympanic)   Ht 6' (1.829 m)   Wt 129.9 kg (286 lb 6 oz)   SpO2 96%   BMI 38.84 kg/m²     Physical Exam  Vitals and nursing note reviewed.   Constitutional:       Appearance: Normal appearance. He is well-developed. He is obese.      Comments:   Blood pressure elevated   Neck:      Vascular: No JVD.   Cardiovascular:      Rate and Rhythm: Normal rate and regular rhythm.      Heart sounds: Normal heart sounds.   Pulmonary:      Effort: Pulmonary effort is normal.      Breath sounds: Normal breath sounds.   Musculoskeletal:      Right lower leg: No edema.      Left lower leg: No edema.     Assessment:       ICD-10-CM ICD-9-CM   1. Annual physical exam  Z00.00 V70.0   2. Screening for colon cancer  Z12.11 V76.51   3. Screening for prostate cancer  Z12.5 V76.44   4. Need for hepatitis C screening test  Z11.59 V73.89   5. Screening for HIV (human immunodeficiency virus)  Z11.4 V73.89   6. Primary hypertension  I10 401.9   7. Asthma with COPD  J44.9 493.20   8. RC (obstructive sleep apnea)  G47.33 327.23   9. Severe obesity (BMI 35.0-39.9) with comorbidity  E66.01 278.01   10. Former smoker  Z87.891 V15.82   11. Screening for lung cancer  Z12.2 V76.0     Plan:   Annual physical exam  Screening for colon cancer  Screening for prostate cancer  Need for hepatitis C screening  test  Screening for HIV (human immunodeficiency virus)  -     CBC Without Differential; Future; Expected date: 07/27/2023  -     Comprehensive Metabolic Panel; Future; Expected date: 07/27/2023  -     Lipid Panel; Future; Expected date: 07/27/2023  -     TSH; Future; Expected date: 07/27/2023  -     Hemoglobin A1C; Future; Expected date: 07/27/2023  -     PSA, Screening; Future; Expected date: 07/27/2023  -     Hepatitis C Antibody; Future; Expected date: 07/27/2023  -     HIV 1/2 Ag/Ab (4th Gen); Future; Expected date: 07/27/2023  -     TESTOSTERONE PANEL; Future; Expected date: 07/27/2023  -     CT Chest Lung Screening Low Dose; Future; Expected date: 07/27/2023  -     Cologuard Screening (Multitarget Stool DNA); Future; Expected date: 07/27/2023  Blood pressure elevated.  BMI 39.  Remainder exam unremarkable.    Check labs.  Treat as indicated.    Agrees to Cologuard test for colon cancer screening  Prostate cancer screening today  Declines all recommended immunizations    Primary hypertension  -     olmesartan (BENICAR) 20 MG tablet; Take 1 tablet (20 mg total) by mouth once daily.  Dispense: 90 tablet; Refill: 0  Blood pressure not at goal   Discontinue beta-blocker   Start Benicar 20 mg daily  Patient education/handout on hypertension   Recheck blood pressure 4 to 6 weeks.    Asthma with COPD  RC (obstructive sleep apnea)  Stable.  Asymptomatic.    Continue per pulmonology   Continue CPAP     Severe obesity (BMI 35.0-39.9) with comorbidity  -     Ambulatory referral/consult to [facility]; Future; Expected date: 08/03/2023  Discussed increased BMI, Increased health risks, Need for weight loss, Lifestyle modifications.  Discussed based on BMI candidate for medications or surgery for weight loss if interested.  Patient request referral to lifestyle and wellness clinic to discuss treatment options    Former smoker  Screening for lung cancer  -     CT Chest Lung Screening Low Dose; Future;  Expected date: 07/27/2023  Greater than 50 years old 20 pack year history, quit smoking 3 years ago  Low-dose CT lung cancer screening indicated in ordered     Return to clinic 4-6 weeks

## 2023-07-28 LAB
ALBUMIN SERPL BCP-MCNC: 3.9 G/DL (ref 3.5–5.2)
ALP SERPL-CCNC: 139 U/L (ref 55–135)
ALT SERPL W/O P-5'-P-CCNC: 45 U/L (ref 10–44)
ANION GAP SERPL CALC-SCNC: 16 MMOL/L (ref 8–16)
AST SERPL-CCNC: 37 U/L (ref 10–40)
BILIRUB SERPL-MCNC: 0.6 MG/DL (ref 0.1–1)
BUN SERPL-MCNC: 16 MG/DL (ref 6–20)
CALCIUM SERPL-MCNC: 9.8 MG/DL (ref 8.7–10.5)
CHLORIDE SERPL-SCNC: 109 MMOL/L (ref 95–110)
CHOLEST SERPL-MCNC: 211 MG/DL (ref 120–199)
CHOLEST/HDLC SERPL: 7.5 {RATIO} (ref 2–5)
CO2 SERPL-SCNC: 18 MMOL/L (ref 23–29)
COMPLEXED PSA SERPL-MCNC: 0.57 NG/ML (ref 0–4)
CREAT SERPL-MCNC: 1.3 MG/DL (ref 0.5–1.4)
EST. GFR  (NO RACE VARIABLE): >60 ML/MIN/1.73 M^2
GLUCOSE SERPL-MCNC: 201 MG/DL (ref 70–110)
HCV AB SERPL QL IA: NORMAL
HDLC SERPL-MCNC: 28 MG/DL (ref 40–75)
HDLC SERPL: 13.3 % (ref 20–50)
HIV 1+2 AB+HIV1 P24 AG SERPL QL IA: NORMAL
LDLC SERPL CALC-MCNC: ABNORMAL MG/DL (ref 63–159)
NONHDLC SERPL-MCNC: 183 MG/DL
POTASSIUM SERPL-SCNC: 4 MMOL/L (ref 3.5–5.1)
PROT SERPL-MCNC: 8.1 G/DL (ref 6–8.4)
SODIUM SERPL-SCNC: 143 MMOL/L (ref 136–145)
TRIGL SERPL-MCNC: 434 MG/DL (ref 30–150)
TSH SERPL DL<=0.005 MIU/L-ACNC: 0.86 UIU/ML (ref 0.4–4)

## 2023-08-02 ENCOUNTER — TELEPHONE (OUTPATIENT)
Dept: PRIMARY CARE CLINIC | Facility: CLINIC | Age: 53
End: 2023-08-02
Payer: COMMERCIAL

## 2023-08-05 LAB
ALBUMIN SERPL-MCNC: 4.2 G/DL (ref 3.6–5.1)
SHBG SERPL-SCNC: 18 NMOL/L (ref 10–50)
TESTOST FREE SERPL-MCNC: 48.3 PG/ML (ref 46–224)
TESTOST SERPL-MCNC: 242 NG/DL (ref 250–1100)
TESTOSTERONE.FREE+WB SERPL-MCNC: 93 NG/DL (ref 110–575)

## 2023-08-10 ENCOUNTER — PATIENT MESSAGE (OUTPATIENT)
Dept: ADMINISTRATIVE | Facility: HOSPITAL | Age: 53
End: 2023-08-10
Payer: COMMERCIAL

## 2023-08-10 LAB — NONINV COLON CA DNA+OCC BLD SCRN STL QL: NORMAL

## 2023-08-25 LAB — NONINV COLON CA DNA+OCC BLD SCRN STL QL: NEGATIVE

## 2023-09-18 ENCOUNTER — TELEPHONE (OUTPATIENT)
Dept: INTERNAL MEDICINE | Facility: CLINIC | Age: 53
End: 2023-09-18
Payer: COMMERCIAL

## 2023-09-18 ENCOUNTER — PATIENT MESSAGE (OUTPATIENT)
Dept: INTERNAL MEDICINE | Facility: CLINIC | Age: 53
End: 2023-09-18
Payer: COMMERCIAL

## 2023-09-18 NOTE — TELEPHONE ENCOUNTER
----- Message from Kathi Bobby sent at 9/18/2023 11:05 AM CDT -----  Contact: self  ..Type:  Patient Returning Call    Who Called:.Kvng Ba Jr.  Who Left Message for Patient:Julia   Does the patient know what this is regarding?: apt   Would the patient rather a call back or a response via [facility]? Call back   Best Call Back Number:.[phone]   Additional Information: pt states he missed a call

## 2023-09-18 NOTE — TELEPHONE ENCOUNTER
----- Message from Roxy Handy sent at 9/18/2023 10:28 AM CDT -----  Regarding: pt call back  Name of Who is Calling:JESSIKA RAMOS JR. [[id number]]          What is the request in detail: pt is leaving to go out of town for work and would like to be seen         tomorrow morning if possible           Can the clinic reply by [facility]: no           What Number to Call Back if not in [facility]: [phone] (home)

## 2023-11-13 ENCOUNTER — OFFICE VISIT (OUTPATIENT)
Dept: INTERNAL MEDICINE | Facility: CLINIC | Age: 53
End: 2023-11-13
Payer: COMMERCIAL

## 2023-11-13 VITALS
HEART RATE: 112 BPM | DIASTOLIC BLOOD PRESSURE: 98 MMHG | SYSTOLIC BLOOD PRESSURE: 148 MMHG | OXYGEN SATURATION: 96 % | BODY MASS INDEX: 39.84 KG/M2 | TEMPERATURE: 97 F | WEIGHT: 294.13 LBS | HEIGHT: 72 IN

## 2023-11-13 DIAGNOSIS — I10 PRIMARY HYPERTENSION: Primary | Chronic | ICD-10-CM

## 2023-11-13 DIAGNOSIS — R73.03 PREDIABETES: ICD-10-CM

## 2023-11-13 DIAGNOSIS — E66.01 SEVERE OBESITY (BMI 35.0-39.9) WITH COMORBIDITY: ICD-10-CM

## 2023-11-13 DIAGNOSIS — Z87.891 FORMER SMOKER: ICD-10-CM

## 2023-11-13 DIAGNOSIS — E78.2 MIXED HYPERLIPIDEMIA: ICD-10-CM

## 2023-11-13 DIAGNOSIS — Z12.2 SCREENING FOR LUNG CANCER: ICD-10-CM

## 2023-11-13 PROBLEM — M96.1 LUMBAR POSTLAMINECTOMY SYNDROME: Status: ACTIVE | Noted: 2023-11-13

## 2023-11-13 PROBLEM — M54.16 LUMBAR RADICULOPATHY, CHRONIC: Status: ACTIVE | Noted: 2023-11-13

## 2023-11-13 PROBLEM — M54.12 CERVICAL RADICULOPATHY: Status: ACTIVE | Noted: 2023-11-13

## 2023-11-13 PROCEDURE — 4010F PR ACE/ARB THEARPY RXD/TAKEN: ICD-10-PCS | Mod: CPTII,S$GLB,, | Performed by: FAMILY MEDICINE

## 2023-11-13 PROCEDURE — 3044F HG A1C LEVEL LT 7.0%: CPT | Mod: CPTII,S$GLB,, | Performed by: FAMILY MEDICINE

## 2023-11-13 PROCEDURE — 1159F PR MEDICATION LIST DOCUMENTED IN MEDICAL RECORD: ICD-10-PCS | Mod: CPTII,S$GLB,, | Performed by: FAMILY MEDICINE

## 2023-11-13 PROCEDURE — 99999 PR PBB SHADOW E&M-EST. PATIENT-LVL IV: ICD-10-PCS | Mod: PBBFAC,,, | Performed by: FAMILY MEDICINE

## 2023-11-13 PROCEDURE — 1159F MED LIST DOCD IN RCRD: CPT | Mod: CPTII,S$GLB,, | Performed by: FAMILY MEDICINE

## 2023-11-13 PROCEDURE — 3080F DIAST BP >= 90 MM HG: CPT | Mod: CPTII,S$GLB,, | Performed by: FAMILY MEDICINE

## 2023-11-13 PROCEDURE — 99214 OFFICE O/P EST MOD 30 MIN: CPT | Mod: S$GLB,,, | Performed by: FAMILY MEDICINE

## 2023-11-13 PROCEDURE — 1160F PR REVIEW ALL MEDS BY PRESCRIBER/CLIN PHARMACIST DOCUMENTED: ICD-10-PCS | Mod: CPTII,S$GLB,, | Performed by: FAMILY MEDICINE

## 2023-11-13 PROCEDURE — 3077F PR MOST RECENT SYSTOLIC BLOOD PRESSURE >= 140 MM HG: ICD-10-PCS | Mod: CPTII,S$GLB,, | Performed by: FAMILY MEDICINE

## 2023-11-13 PROCEDURE — 4010F ACE/ARB THERAPY RXD/TAKEN: CPT | Mod: CPTII,S$GLB,, | Performed by: FAMILY MEDICINE

## 2023-11-13 PROCEDURE — 3080F PR MOST RECENT DIASTOLIC BLOOD PRESSURE >= 90 MM HG: ICD-10-PCS | Mod: CPTII,S$GLB,, | Performed by: FAMILY MEDICINE

## 2023-11-13 PROCEDURE — 3008F BODY MASS INDEX DOCD: CPT | Mod: CPTII,S$GLB,, | Performed by: FAMILY MEDICINE

## 2023-11-13 PROCEDURE — 3044F PR MOST RECENT HEMOGLOBIN A1C LEVEL <7.0%: ICD-10-PCS | Mod: CPTII,S$GLB,, | Performed by: FAMILY MEDICINE

## 2023-11-13 PROCEDURE — 99214 PR OFFICE/OUTPT VISIT, EST, LEVL IV, 30-39 MIN: ICD-10-PCS | Mod: S$GLB,,, | Performed by: FAMILY MEDICINE

## 2023-11-13 PROCEDURE — 1160F RVW MEDS BY RX/DR IN RCRD: CPT | Mod: CPTII,S$GLB,, | Performed by: FAMILY MEDICINE

## 2023-11-13 PROCEDURE — 3077F SYST BP >= 140 MM HG: CPT | Mod: CPTII,S$GLB,, | Performed by: FAMILY MEDICINE

## 2023-11-13 PROCEDURE — 99999 PR PBB SHADOW E&M-EST. PATIENT-LVL IV: CPT | Mod: PBBFAC,,, | Performed by: FAMILY MEDICINE

## 2023-11-13 PROCEDURE — 3008F PR BODY MASS INDEX (BMI) DOCUMENTED: ICD-10-PCS | Mod: CPTII,S$GLB,, | Performed by: FAMILY MEDICINE

## 2023-11-13 RX ORDER — OMEGA-3-ACID ETHYL ESTERS 1 G/1
2 CAPSULE, LIQUID FILLED ORAL 2 TIMES DAILY
Qty: 360 CAPSULE | Refills: 3 | Status: SHIPPED | OUTPATIENT
Start: 2023-11-13 | End: 2024-11-12

## 2023-11-13 RX ORDER — OLMESARTAN MEDOXOMIL 40 MG/1
40 TABLET ORAL DAILY
Qty: 90 TABLET | Refills: 3 | Status: SHIPPED | OUTPATIENT
Start: 2023-11-13 | End: 2024-11-12

## 2023-11-13 RX ORDER — METFORMIN HYDROCHLORIDE 500 MG/1
500 TABLET ORAL
Qty: 90 TABLET | Refills: 3 | Status: ON HOLD | OUTPATIENT
Start: 2023-11-13 | End: 2024-02-24 | Stop reason: HOSPADM

## 2023-11-13 NOTE — PROGRESS NOTES
Subjective:   Patient ID: Kvng Ba Jr. is a 53 y.o. male.  Chief Complaint:  No chief complaint on file.    Presents for 4 month follow-up     Last visit July 2023   Total testosterone level and bioavailable testosterone level low, but free testosterone level normal.  Does not meet criteria for testosterone supplementation  CMP with elevated glucose, otherwise normal kidney function and liver function  A1c 6% and elevated. New diagnosis prediabetes  Lipid panel with significant abnormalities and increased 10 year cardiovascular risk  Lipid CBC with normal white cell count, red cell count, and platelet levels  TSH normal  PSA level normal  Hepatitis-C and HIV screens negative    Medical history:  - Hypertension.  Last visit change from Toprol-XL 25 mg daily to Benicar 20 mg daily. Reports compliance.  Denies side effects.  Blood pressure improved but remains elevated.  Denies any shortness of breath or swelling.  - Asthma/COPD.  Albuterol p.r.n..  Followed by pulmonology.    - ED. On Stendra 200 mg daily as needed.  Reports effective.  No side effects.    - RC on CPAP.  Reports compliance with medical device.    - Severe obesity when quit smoking.  Interested in assistance with weight loss.  Would like to see lifestyle and wellness clinic.  - Former smoker.  Quit 3 years ago.  No previous low-dose CT lung cancer screening on file.  Does have 20 pack-year history.  Meets criteria for low-dose CT lung cancer screening.  Scheduled last visit, but missed appointment.  Needs to be rescheduled.  - Prediabetes.  New diagnosis.  Glucose 201.  A1c 6%.  Asymptomatic.  - Hyperlipidemia.  Triglycerides greater than 400.  HDL less than 30.  LDL unable calculated.  Denies any chest pain or claudication.    Review of Systems   Constitutional:  Negative for activity change, chills, diaphoresis, fatigue, fever and unexpected weight change.   HENT:  Negative for hearing loss, rhinorrhea and trouble swallowing.    Eyes:   Negative for discharge and visual disturbance.   Respiratory:  Negative for cough, chest tightness, shortness of breath and wheezing.    Cardiovascular:  Negative for chest pain, palpitations and leg swelling.   Gastrointestinal:  Negative for abdominal pain, blood in stool, constipation, diarrhea, nausea and vomiting.   Endocrine: Negative for cold intolerance, heat intolerance, polydipsia, polyphagia and polyuria.   Genitourinary:  Negative for difficulty urinating, hematuria and urgency.   Musculoskeletal:  Negative for arthralgias, joint swelling, myalgias and neck pain.   Skin:  Negative for rash.   Neurological:  Negative for dizziness, tremors, syncope, weakness, light-headedness, numbness and headaches.   Hematological:  Does not bruise/bleed easily.   Psychiatric/Behavioral:  Negative for confusion, dysphoric mood and sleep disturbance. The patient is not nervous/anxious.        Objective:   BP (!) 148/98 (BP Location: Left arm, Patient Position: Sitting, BP Method: Large (Manual))   Pulse (!) 112   Temp 96.7 °F (35.9 °C) (Tympanic)   Ht 6' (1.829 m)   Wt 133.4 kg (294 lb 1.5 oz)   SpO2 96%   BMI 39.89 kg/m²     Physical Exam  Vitals and nursing note reviewed.   Constitutional:       Appearance: Normal appearance. He is well-developed. He is obese.      Comments:   Blood pressure elevated   Eyes:      General: No scleral icterus.     Conjunctiva/sclera: Conjunctivae normal.   Neck:      Vascular: Normal carotid pulses. No carotid bruit or JVD.   Cardiovascular:      Rate and Rhythm: Regular rhythm. Tachycardia present.      Pulses: Normal pulses.      Heart sounds: Normal heart sounds.   Pulmonary:      Effort: Pulmonary effort is normal.      Breath sounds: Normal breath sounds.   Abdominal:      General: There is no distension.      Palpations: Abdomen is soft.      Tenderness: There is no abdominal tenderness.   Musculoskeletal:      Right lower leg: No edema.      Left lower leg: No edema.    Skin:     Findings: No rash.       Assessment:       ICD-10-CM ICD-9-CM   1. Primary hypertension  I10 401.9   2. Prediabetes  R73.03 790.29   3. Severe obesity (BMI 35.0-39.9) with comorbidity  E66.01 278.01   4. Mixed hyperlipidemia  E78.2 272.2   5. Former smoker  Z87.891 V15.82   6. Screening for lung cancer  Z12.2 V76.0     Plan:   Primary hypertension  -     olmesartan (BENICAR) 40 MG tablet; Take 1 tablet (40 mg total) by mouth once daily.  Dispense: 90 tablet; Refill: 3  Blood pressure improved but remains elevated   Increase Benicar 40 mg daily  Recheck blood pressure 6 to 8 weeks     Prediabetes  -     metFORMIN (GLUCOPHAGE) 500 MG tablet; Take 1 tablet (500 mg total) by mouth daily with breakfast.  Dispense: 90 tablet; Refill: 3  -     Ambulatory referral/consult to [facility]; Future; Expected date: 11/20/2023  Patient education/discussion on prediabetes.  Start Metformin 500 mg daily.  Recheck CMP and A1c in 3 months.    Severe obesity (BMI 35.0-39.9) with comorbidity  -     Ambulatory referral/consult to [facility]; Future; Expected date: 11/20/2023    Mixed hyperlipidemia  -     omega-3 acid ethyl esters (LOVAZA) 1 gram capsule; Take 2 capsules (2 g total) by mouth 2 (two) times daily.  Dispense: 360 capsule; Refill: 3    Former smoker  Screening for lung cancer  -     CT Chest Lung Screening Low Dose; Future; Expected date: 11/13/2023    Return to clinic 6 weeks

## 2023-11-17 ENCOUNTER — TELEPHONE (OUTPATIENT)
Dept: INTERNAL MEDICINE | Facility: CLINIC | Age: 53
End: 2023-11-17
Payer: COMMERCIAL

## 2023-11-17 NOTE — TELEPHONE ENCOUNTER
Spoke with patient and informed him per Dr. Love to try the Lamisil cream over the counter and if that doesn't work, to call back and we can schedule him a virtual visit to discuss an oral medication. Patient stated that [place] and would try the Lamisil cream.

## 2023-11-17 NOTE — TELEPHONE ENCOUNTER
----- Message from Jose Lee sent at 11/17/2023  9:32 AM CST -----  Contact: patient  Kvng French Jesenia Gutierrez. Is requesting an oral medication for athletes foot, due to the spray he is using not working. The pt preferred pharmacy is listed below. The pt can be reached @ [phone].    [facility]/pharmacy #[id number] - [address] [address]  Phone: [phone] Fax: [phone]

## 2023-11-17 NOTE — TELEPHONE ENCOUNTER
Spoke with patient and he stated that he has been dealing with Athlete's Foot for about two weeks now. He said he has been using over the counter sprays but they are not helping at all. He wants to know if Dr. Love could prescribe something for him.

## 2024-01-02 ENCOUNTER — OFFICE VISIT (OUTPATIENT)
Dept: INTERNAL MEDICINE | Facility: CLINIC | Age: 54
End: 2024-01-02
Payer: COMMERCIAL

## 2024-01-02 VITALS
SYSTOLIC BLOOD PRESSURE: 128 MMHG | BODY MASS INDEX: 39.42 KG/M2 | HEART RATE: 102 BPM | HEIGHT: 72 IN | OXYGEN SATURATION: 97 % | WEIGHT: 291 LBS | DIASTOLIC BLOOD PRESSURE: 84 MMHG | TEMPERATURE: 96 F

## 2024-01-02 DIAGNOSIS — Z87.891 FORMER SMOKER: ICD-10-CM

## 2024-01-02 DIAGNOSIS — E66.01 SEVERE OBESITY (BMI 35.0-39.9) WITH COMORBIDITY: Chronic | ICD-10-CM

## 2024-01-02 DIAGNOSIS — R73.03 PREDIABETES: ICD-10-CM

## 2024-01-02 DIAGNOSIS — F40.10 SOCIAL ANXIETY DISORDER: ICD-10-CM

## 2024-01-02 DIAGNOSIS — E78.2 MIXED HYPERLIPIDEMIA: ICD-10-CM

## 2024-01-02 DIAGNOSIS — I10 PRIMARY HYPERTENSION: Primary | Chronic | ICD-10-CM

## 2024-01-02 PROCEDURE — 1160F RVW MEDS BY RX/DR IN RCRD: CPT | Mod: CPTII,S$GLB,, | Performed by: FAMILY MEDICINE

## 2024-01-02 PROCEDURE — 99999 PR PBB SHADOW E&M-EST. PATIENT-LVL IV: CPT | Mod: PBBFAC,,, | Performed by: FAMILY MEDICINE

## 2024-01-02 PROCEDURE — 3074F SYST BP LT 130 MM HG: CPT | Mod: CPTII,S$GLB,, | Performed by: FAMILY MEDICINE

## 2024-01-02 PROCEDURE — 1159F MED LIST DOCD IN RCRD: CPT | Mod: CPTII,S$GLB,, | Performed by: FAMILY MEDICINE

## 2024-01-02 PROCEDURE — 3079F DIAST BP 80-89 MM HG: CPT | Mod: CPTII,S$GLB,, | Performed by: FAMILY MEDICINE

## 2024-01-02 PROCEDURE — 99214 OFFICE O/P EST MOD 30 MIN: CPT | Mod: S$GLB,,, | Performed by: FAMILY MEDICINE

## 2024-01-02 PROCEDURE — 3008F BODY MASS INDEX DOCD: CPT | Mod: CPTII,S$GLB,, | Performed by: FAMILY MEDICINE

## 2024-01-02 RX ORDER — MELOXICAM 15 MG/1
15 TABLET ORAL
Status: ON HOLD | COMMUNITY
Start: 2023-12-14 | End: 2024-02-24 | Stop reason: HOSPADM

## 2024-01-02 NOTE — PROGRESS NOTES
Subjective:   Patient ID: Kvng Ba Jr. is a 53 y.o. male.  Chief Complaint:  Hypertension    Presents follow-up on hypertension, prediabetes, and hyperlipidemia    Last visit November 2023     - Hypertension.  Previously uncontrolled.  Increase Benicar 40 mg daily.  Reports compliance.  Denies side effects.  Blood pressure improved.  Denies any shortness breath swelling.    - Prediabetes.  New diagnosis.  Asymptomatic.  Started metformin 500 mg daily.  Reports compliance.  Denies side effects.  Denies symptoms hypo hyperglycemia  - Obesity.  Referred to lifestyle and wellness clinic.   - Mixed hyperlipidemia.  Significant triglyceride elevation.  Started Lovaza capsules twice a day.  Reports compliance.  Denies side effects.  Denies chest pain or claudication.  - Former smoker.  Reports continues to remain tobacco free.  Still needs to obtain annual low-dose CT lung cancer screening.    Only complaint today is increased social anxiety disorder symptoms on most recent cruise, otherwise doing well    Review of Systems   Constitutional:  Negative for chills, diaphoresis, fatigue and fever.   Eyes:  Negative for visual disturbance.   Respiratory:  Negative for cough, chest tightness, shortness of breath and wheezing.    Cardiovascular:  Negative for chest pain, palpitations and leg swelling.   Gastrointestinal:  Negative for abdominal pain, constipation, diarrhea, nausea and vomiting.   Endocrine: Negative for cold intolerance, heat intolerance, polydipsia, polyphagia and polyuria.   Genitourinary:  Negative for difficulty urinating.   Musculoskeletal:  Negative for myalgias and neck pain.   Skin:  Negative for rash.   Neurological:  Negative for dizziness, tremors, syncope, weakness, light-headedness, numbness and headaches.   Hematological:  Does not bruise/bleed easily.   Psychiatric/Behavioral:  Negative for sleep disturbance. The patient is nervous/anxious.        Objective:   /84 (BP Location:  Left arm, Patient Position: Sitting, BP Method: Large (Manual))   Pulse 102   Temp 96.1 °F (35.6 °C) (Tympanic)   Ht 6' (1.829 m)   Wt 132 kg (291 lb 0.1 oz)   SpO2 97%   BMI 39.47 kg/m²     Physical Exam  Vitals and nursing note reviewed.   Constitutional:       Appearance: Normal appearance. He is well-developed. He is obese.   Neck:      Vascular: No JVD.   Cardiovascular:      Rate and Rhythm: Normal rate and regular rhythm.      Heart sounds: Normal heart sounds.   Pulmonary:      Effort: Pulmonary effort is normal.      Breath sounds: Normal breath sounds.   Musculoskeletal:      Right lower leg: No edema.      Left lower leg: No edema.       Assessment:       ICD-10-CM ICD-9-CM   1. Primary hypertension  I10 401.9   2. Mixed hyperlipidemia  E78.2 272.2   3. Prediabetes  R73.03 790.29   4. Severe obesity (BMI 35.0-39.9) with comorbidity  E66.01 278.01   5. Social anxiety disorder  F40.10 300.23   6. Former smoker  Z87.891 V15.82     Plan:   Primary hypertension  Improved/controlled. Asymptomatic.  BP at goal.    Tolerating medication without significant side effects   Continue Benicar 40 mg daily     Mixed hyperlipidemia  -     Comprehensive Metabolic Panel; Future; Expected date: 01/02/2024  -     Lipid Panel; Future; Expected date: 01/02/2024  Asymptomatic   Tolerating medication without significant side effects   Continue Lovaza 2 g twice a day  Recheck CMP lipid panel 6-8 weeks     Prediabetes  -     Hemoglobin A1C; Future; Expected date: 01/02/2024  Asymptomatic   Tolerating medication without significant side effects   Continue metformin 500 mg daily  Recheck A1c 6-8 weeks     Severe obesity (BMI 35.0-39.9) with comorbidity  Establish with lifestyle and wellness clinic as planned  Discuss trying to improve current diet and increasing overall physical activity level to help promote any weight gain obtain BMI greater than 40    Social anxiety disorder  Offered referral for counseling/therapy but  declined     Former smoker  Advised to obtain low-dose CT lung cancer screening previously recommended     Return to clinic 6 months for annual physical exam or sooner if needed

## 2024-02-20 ENCOUNTER — TELEPHONE (OUTPATIENT)
Dept: INTERNAL MEDICINE | Facility: CLINIC | Age: 54
End: 2024-02-20
Payer: COMMERCIAL

## 2024-02-20 ENCOUNTER — OFFICE VISIT (OUTPATIENT)
Dept: INTERNAL MEDICINE | Facility: CLINIC | Age: 54
End: 2024-02-20
Payer: COMMERCIAL

## 2024-02-20 VITALS
DIASTOLIC BLOOD PRESSURE: 82 MMHG | WEIGHT: 283.31 LBS | HEART RATE: 117 BPM | OXYGEN SATURATION: 97 % | SYSTOLIC BLOOD PRESSURE: 140 MMHG | TEMPERATURE: 96 F | HEIGHT: 72 IN | BODY MASS INDEX: 38.37 KG/M2

## 2024-02-20 DIAGNOSIS — R35.0 URINARY FREQUENCY: ICD-10-CM

## 2024-02-20 DIAGNOSIS — I10 PRIMARY HYPERTENSION: Chronic | ICD-10-CM

## 2024-02-20 DIAGNOSIS — F40.10 SOCIAL ANXIETY DISORDER: ICD-10-CM

## 2024-02-20 DIAGNOSIS — E78.2 MIXED HYPERLIPIDEMIA: ICD-10-CM

## 2024-02-20 DIAGNOSIS — R73.03 PREDIABETES: Chronic | ICD-10-CM

## 2024-02-20 DIAGNOSIS — R53.83 FATIGUE, UNSPECIFIED TYPE: Primary | ICD-10-CM

## 2024-02-20 PROCEDURE — 4010F ACE/ARB THERAPY RXD/TAKEN: CPT | Mod: CPTII,S$GLB,, | Performed by: FAMILY MEDICINE

## 2024-02-20 PROCEDURE — 3079F DIAST BP 80-89 MM HG: CPT | Mod: CPTII,S$GLB,, | Performed by: FAMILY MEDICINE

## 2024-02-20 PROCEDURE — 99999 PR PBB SHADOW E&M-EST. PATIENT-LVL III: CPT | Mod: PBBFAC,,, | Performed by: FAMILY MEDICINE

## 2024-02-20 PROCEDURE — 3077F SYST BP >= 140 MM HG: CPT | Mod: CPTII,S$GLB,, | Performed by: FAMILY MEDICINE

## 2024-02-20 PROCEDURE — 99215 OFFICE O/P EST HI 40 MIN: CPT | Mod: S$GLB,,, | Performed by: FAMILY MEDICINE

## 2024-02-20 PROCEDURE — 3008F BODY MASS INDEX DOCD: CPT | Mod: CPTII,S$GLB,, | Performed by: FAMILY MEDICINE

## 2024-02-20 RX ORDER — PROPRANOLOL HYDROCHLORIDE 20 MG/1
20 TABLET ORAL 2 TIMES DAILY
Qty: 60 TABLET | Refills: 0 | Status: SHIPPED | OUTPATIENT
Start: 2024-02-20 | End: 2024-02-26

## 2024-02-20 NOTE — PROGRESS NOTES
Subjective:   Patient ID: Kvng Ba Jr. is a 53 y.o. male.  Chief Complaint:  Blurred Vision, Urinary Frequency, Shortness of Breath, Fatigue, Shaking, and Abdominal Pain    Presents for evaluation of multiple complaints as possible side effects of medication     Medical history:  - Hypertension.  Benicar 40 mg daily.  Blood pressure controlled.  Last visit denied any side effects from medication.  Complains of shortness a breath but no swelling.    - Hyperlipidemia.  On Lovaza.  Needs repeat CMP and lipid panel.  Last visit denied any side effects from medication.  Denies any current chest pain.  - Prediabetes.  On metformin 500 mg daily.  Needs repeat A1c.  Last visit denied any side effects from medication.  Complains of urinary frequency, blurry vision, and increased abdominal pain    History of social anxiety disorder, but no previous treatment for depression or generalized anxiety disorder   Denies any specific trigger for these events   Reports all started within the past 3-4 weeks    Review of Systems   Constitutional:  Positive for fatigue.   Eyes:  Positive for visual disturbance.   Respiratory:  Positive for shortness of breath.    Cardiovascular:  Positive for palpitations.   Gastrointestinal:  Positive for abdominal distention, abdominal pain and nausea.   Endocrine: Positive for polydipsia, polyphagia and polyuria.   Genitourinary:  Positive for frequency.   Neurological:  Positive for tremors.     Objective:   BP (!) 140/82 (BP Location: Right arm, Patient Position: Sitting, BP Method: Large (Manual))   Pulse (!) 117   Temp 96 °F (35.6 °C) (Tympanic)   Ht 6' (1.829 m)   Wt 128.5 kg (283 lb 4.7 oz)   SpO2 97%   BMI 38.42 kg/m²     Physical Exam  Vitals and nursing note reviewed.   Constitutional:       Appearance: Normal appearance. He is well-developed. He is obese.      Comments:   Blood pressure mildly elevated   Cardiovascular:      Rate and Rhythm: Regular rhythm. Tachycardia  present.   Pulmonary:      Effort: Pulmonary effort is normal.   Musculoskeletal:      Right lower leg: No edema.      Left lower leg: No edema.   Skin:     Findings: No rash.   Psychiatric:         Attention and Perception: Attention normal. He is attentive.         Mood and Affect: Affect normal. Mood is anxious.         Speech: Speech normal.         Behavior: Behavior normal. Behavior is cooperative.         Thought Content: Thought content normal. Thought content is not paranoid or delusional.       Assessment:       ICD-10-CM ICD-9-CM   1. Fatigue, unspecified type  R53.83 780.79   2. Urinary frequency  R35.0 788.41   3. Social anxiety disorder  F40.10 300.23   4. Primary hypertension  I10 401.9   5. Prediabetes  R73.03 790.29   6. Mixed hyperlipidemia  E78.2 272.2     Plan:   Fatigue, unspecified type  Urinary frequency  Social anxiety disorder  -     TSH; Future; Expected date: 02/20/2024  -     CBC Without Differential; Future; Expected date: 02/20/2024  -     Urinalysis, Reflex to Urine Culture Urine, Clean Catch; Future; Expected date: 02/20/2024  -     propranoloL (INDERAL) 20 MG tablet; Take 1 tablet (20 mg total) by mouth 2 (two) times daily.  Dispense: 60 tablet; Refill: 0  Reviewed list of complaints in discussed potential for increased anxiety related symptoms and somatization  However recommend additional TSH and CBC added to today's blood work to rule out any other underlying secondary cause of symptoms   Not likely to be related to side effects of medications since not present at last visit   Start low-dose propanolol 20 mg twice a day     Primary hypertension  -     propranoloL (INDERAL) 20 MG tablet; Take 1 tablet (20 mg total) by mouth 2 (two) times daily.  Dispense: 60 tablet; Refill: 0  Uncontrolled.  BP mildly elevated.  Tachycardic.  Continue Benicar 40 mg daily since symptoms not likely side effect to Arb   Add low-dose propanolol 20 mg twice a day   Reassess 1 week     Prediabetes  A1c  drawn today   For now can hold metformin as potential cause of GI related symptoms  Readdress next visit     Mixed hyperlipidemia  Lipid panel and CMP drawn today   For now can hold Lovaza as potential cause of complaints today  Readdress next visit     Return to clinic 1 week    50+ minutes of total time spent on the encounter, which includes face to face time and non-face to face time preparing to see the patient (eg, review of tests), Obtaining and/or reviewing separately obtained history, documenting clinical information in the electronic or other health record, independently interpreting results (not separately reported) and communicating results to the patient/family/caregiver, or Care coordination (not separately reported).

## 2024-02-21 ENCOUNTER — HOSPITAL ENCOUNTER (INPATIENT)
Facility: HOSPITAL | Age: 54
LOS: 1 days | Discharge: HOME | DRG: 637 | End: 2024-02-24
Attending: EMERGENCY MEDICINE | Admitting: EMERGENCY MEDICINE
Payer: COMMERCIAL

## 2024-02-21 DIAGNOSIS — E11.10 DIABETIC KETOACIDOSIS WITHOUT COMA ASSOCIATED WITH TYPE 2 DIABETES MELLITUS: Primary | ICD-10-CM

## 2024-02-21 DIAGNOSIS — R73.9 HYPERGLYCEMIA: ICD-10-CM

## 2024-02-21 DIAGNOSIS — E11.9 TYPE 2 DIABETES MELLITUS WITHOUT COMPLICATION: ICD-10-CM

## 2024-02-21 DIAGNOSIS — I10 PRIMARY HYPERTENSION: Chronic | ICD-10-CM

## 2024-02-21 PROBLEM — K85.90 ACUTE PANCREATITIS: Status: ACTIVE | Noted: 2024-02-21

## 2024-02-21 PROBLEM — R73.03 PREDIABETES: Chronic | Status: RESOLVED | Noted: 2024-01-02 | Resolved: 2024-02-21

## 2024-02-21 PROBLEM — E78.2 MIXED HYPERLIPIDEMIA: Status: ACTIVE | Noted: 2024-02-21

## 2024-02-21 PROBLEM — E88.810 METABOLIC SYNDROME: Chronic | Status: ACTIVE | Noted: 2024-02-21

## 2024-02-21 PROBLEM — N17.9 AKI (ACUTE KIDNEY INJURY): Status: ACTIVE | Noted: 2024-02-21

## 2024-02-21 LAB
ALBUMIN SERPL BCP-MCNC: 3.7 G/DL (ref 3.5–5.2)
ALLENS TEST: ABNORMAL
ALP SERPL-CCNC: 185 U/L (ref 55–135)
ALT SERPL W/O P-5'-P-CCNC: 39 U/L (ref 10–44)
ANION GAP SERPL CALC-SCNC: 11 MMOL/L (ref 8–16)
ANION GAP SERPL CALC-SCNC: 12 MMOL/L (ref 8–16)
ANION GAP SERPL CALC-SCNC: 12 MMOL/L (ref 8–16)
ANION GAP SERPL CALC-SCNC: 13 MMOL/L (ref 8–16)
ANION GAP SERPL CALC-SCNC: 16 MMOL/L (ref 8–16)
ANION GAP SERPL CALC-SCNC: 19 MMOL/L (ref 8–16)
ANION GAP SERPL CALC-SCNC: 9 MMOL/L (ref 8–16)
AST SERPL-CCNC: 30 U/L (ref 10–40)
B-OH-BUTYR BLD STRIP-SCNC: 1.7 MMOL/L (ref 0–0.5)
BACTERIA #/AREA URNS HPF: NORMAL /HPF
BASOPHILS # BLD AUTO: 0.08 K/UL (ref 0–0.2)
BASOPHILS # BLD AUTO: 0.08 K/UL (ref 0–0.2)
BASOPHILS NFR BLD: 0.7 % (ref 0–1.9)
BASOPHILS NFR BLD: 0.8 % (ref 0–1.9)
BILIRUB SERPL-MCNC: 0.8 MG/DL (ref 0.1–1)
BILIRUB UR QL STRIP: NEGATIVE
BUN SERPL-MCNC: 21 MG/DL (ref 6–20)
BUN SERPL-MCNC: 23 MG/DL (ref 6–20)
BUN SERPL-MCNC: 24 MG/DL (ref 6–20)
BUN SERPL-MCNC: 25 MG/DL (ref 6–20)
BUN SERPL-MCNC: 26 MG/DL (ref 6–20)
BUN SERPL-MCNC: 30 MG/DL (ref 6–20)
BUN SERPL-MCNC: 34 MG/DL (ref 6–20)
CALCIUM SERPL-MCNC: 8.8 MG/DL (ref 8.7–10.5)
CALCIUM SERPL-MCNC: 8.8 MG/DL (ref 8.7–10.5)
CALCIUM SERPL-MCNC: 8.9 MG/DL (ref 8.7–10.5)
CALCIUM SERPL-MCNC: 8.9 MG/DL (ref 8.7–10.5)
CALCIUM SERPL-MCNC: 9.1 MG/DL (ref 8.7–10.5)
CALCIUM SERPL-MCNC: 9.2 MG/DL (ref 8.7–10.5)
CALCIUM SERPL-MCNC: 9.4 MG/DL (ref 8.7–10.5)
CHLORIDE SERPL-SCNC: 102 MMOL/L (ref 95–110)
CHLORIDE SERPL-SCNC: 105 MMOL/L (ref 95–110)
CHLORIDE SERPL-SCNC: 105 MMOL/L (ref 95–110)
CHLORIDE SERPL-SCNC: 106 MMOL/L (ref 95–110)
CHLORIDE SERPL-SCNC: 107 MMOL/L (ref 95–110)
CHLORIDE SERPL-SCNC: 83 MMOL/L (ref 95–110)
CHLORIDE SERPL-SCNC: 96 MMOL/L (ref 95–110)
CLARITY UR: CLEAR
CO2 SERPL-SCNC: 13 MMOL/L (ref 23–29)
CO2 SERPL-SCNC: 14 MMOL/L (ref 23–29)
CO2 SERPL-SCNC: 16 MMOL/L (ref 23–29)
CO2 SERPL-SCNC: 17 MMOL/L (ref 23–29)
CO2 SERPL-SCNC: 18 MMOL/L (ref 23–29)
CO2 SERPL-SCNC: 18 MMOL/L (ref 23–29)
CO2 SERPL-SCNC: 19 MMOL/L (ref 23–29)
COLOR UR: COLORLESS
CREAT SERPL-MCNC: 1.5 MG/DL (ref 0.5–1.4)
CREAT SERPL-MCNC: 1.5 MG/DL (ref 0.5–1.4)
CREAT SERPL-MCNC: 1.6 MG/DL (ref 0.5–1.4)
CREAT SERPL-MCNC: 1.6 MG/DL (ref 0.5–1.4)
CREAT SERPL-MCNC: 1.9 MG/DL (ref 0.5–1.4)
CREAT SERPL-MCNC: 2.4 MG/DL (ref 0.5–1.4)
CREAT SERPL-MCNC: 2.8 MG/DL (ref 0.5–1.4)
DIFFERENTIAL METHOD BLD: ABNORMAL
DIFFERENTIAL METHOD BLD: ABNORMAL
EOSINOPHIL # BLD AUTO: 0.2 K/UL (ref 0–0.5)
EOSINOPHIL # BLD AUTO: 0.3 K/UL (ref 0–0.5)
EOSINOPHIL NFR BLD: 1.8 % (ref 0–8)
EOSINOPHIL NFR BLD: 2.5 % (ref 0–8)
ERYTHROCYTE [DISTWIDTH] IN BLOOD BY AUTOMATED COUNT: 13.2 % (ref 11.5–14.5)
ERYTHROCYTE [DISTWIDTH] IN BLOOD BY AUTOMATED COUNT: 13.8 % (ref 11.5–14.5)
EST. GFR  (NO RACE VARIABLE): 26 ML/MIN/1.73 M^2
EST. GFR  (NO RACE VARIABLE): 31 ML/MIN/1.73 M^2
EST. GFR  (NO RACE VARIABLE): 42 ML/MIN/1.73 M^2
EST. GFR  (NO RACE VARIABLE): 51 ML/MIN/1.73 M^2
EST. GFR  (NO RACE VARIABLE): 51 ML/MIN/1.73 M^2
EST. GFR  (NO RACE VARIABLE): 55 ML/MIN/1.73 M^2
EST. GFR  (NO RACE VARIABLE): 55 ML/MIN/1.73 M^2
GLUCOSE SERPL-MCNC: 1134 MG/DL (ref 70–110)
GLUCOSE SERPL-MCNC: 202 MG/DL (ref 70–110)
GLUCOSE SERPL-MCNC: 208 MG/DL (ref 70–110)
GLUCOSE SERPL-MCNC: 240 MG/DL (ref 70–110)
GLUCOSE SERPL-MCNC: 275 MG/DL (ref 70–110)
GLUCOSE SERPL-MCNC: 426 MG/DL (ref 70–110)
GLUCOSE SERPL-MCNC: 763 MG/DL (ref 70–110)
GLUCOSE UR QL STRIP: ABNORMAL
HCO3 UR-SCNC: 21.1 MMOL/L (ref 24–28)
HCT VFR BLD AUTO: 39.4 % (ref 40–54)
HCT VFR BLD AUTO: 45.6 % (ref 40–54)
HGB BLD-MCNC: 13.7 G/DL (ref 14–18)
HGB BLD-MCNC: 14.7 G/DL (ref 14–18)
HGB UR QL STRIP: NEGATIVE
IMM GRANULOCYTES # BLD AUTO: 0.05 K/UL (ref 0–0.04)
IMM GRANULOCYTES # BLD AUTO: 0.06 K/UL (ref 0–0.04)
IMM GRANULOCYTES NFR BLD AUTO: 0.5 % (ref 0–0.5)
IMM GRANULOCYTES NFR BLD AUTO: 0.6 % (ref 0–0.5)
KETONES UR QL STRIP: ABNORMAL
LEUKOCYTE ESTERASE UR QL STRIP: NEGATIVE
LIPASE SERPL-CCNC: 166 U/L (ref 4–60)
LYMPHOCYTES # BLD AUTO: 2.8 K/UL (ref 1–4.8)
LYMPHOCYTES # BLD AUTO: 3.2 K/UL (ref 1–4.8)
LYMPHOCYTES NFR BLD: 25.7 % (ref 18–48)
LYMPHOCYTES NFR BLD: 30.5 % (ref 18–48)
MAGNESIUM SERPL-MCNC: 2.4 MG/DL (ref 1.6–2.6)
MCH RBC QN AUTO: 29 PG (ref 27–31)
MCH RBC QN AUTO: 29.3 PG (ref 27–31)
MCHC RBC AUTO-ENTMCNC: 32.2 G/DL (ref 32–36)
MCHC RBC AUTO-ENTMCNC: 34.8 G/DL (ref 32–36)
MCV RBC AUTO: 84 FL (ref 82–98)
MCV RBC AUTO: 90 FL (ref 82–98)
MICROSCOPIC COMMENT: NORMAL
MONOCYTES # BLD AUTO: 0.6 K/UL (ref 0.3–1)
MONOCYTES # BLD AUTO: 0.7 K/UL (ref 0.3–1)
MONOCYTES NFR BLD: 5.5 % (ref 4–15)
MONOCYTES NFR BLD: 6.8 % (ref 4–15)
NEUTROPHILS # BLD AUTO: 6.3 K/UL (ref 1.8–7.7)
NEUTROPHILS # BLD AUTO: 7 K/UL (ref 1.8–7.7)
NEUTROPHILS NFR BLD: 60.2 % (ref 38–73)
NEUTROPHILS NFR BLD: 64.4 % (ref 38–73)
NITRITE UR QL STRIP: NEGATIVE
NRBC BLD-RTO: 0 /100 WBC
NRBC BLD-RTO: 0 /100 WBC
OHS QRS DURATION: 88 MS
OHS QTC CALCULATION: 453 MS
PCO2 BLDA: 42.2 MMHG (ref 35–45)
PH SMN: 7.31 [PH] (ref 7.35–7.45)
PH UR STRIP: 5 [PH] (ref 5–8)
PHOSPHATE SERPL-MCNC: 2.2 MG/DL (ref 2.7–4.5)
PHOSPHATE SERPL-MCNC: 2.5 MG/DL (ref 2.7–4.5)
PHOSPHATE SERPL-MCNC: 2.5 MG/DL (ref 2.7–4.5)
PHOSPHATE SERPL-MCNC: 2.9 MG/DL (ref 2.7–4.5)
PLATELET # BLD AUTO: 228 K/UL (ref 150–450)
PLATELET # BLD AUTO: 274 K/UL (ref 150–450)
PMV BLD AUTO: 10.6 FL (ref 9.2–12.9)
PMV BLD AUTO: 10.9 FL (ref 9.2–12.9)
PO2 BLDA: 37 MMHG (ref 40–60)
POC BE: -5 MMOL/L
POC SATURATED O2: 65 % (ref 95–100)
POCT GLUCOSE: 158 MG/DL (ref 70–110)
POCT GLUCOSE: 167 MG/DL (ref 70–110)
POCT GLUCOSE: 183 MG/DL (ref 70–110)
POCT GLUCOSE: 198 MG/DL (ref 70–110)
POCT GLUCOSE: 204 MG/DL (ref 70–110)
POCT GLUCOSE: 205 MG/DL (ref 70–110)
POCT GLUCOSE: 229 MG/DL (ref 70–110)
POCT GLUCOSE: 252 MG/DL (ref 70–110)
POCT GLUCOSE: 306 MG/DL (ref 70–110)
POCT GLUCOSE: 332 MG/DL (ref 70–110)
POCT GLUCOSE: 354 MG/DL (ref 70–110)
POCT GLUCOSE: >500 MG/DL (ref 70–110)
POTASSIUM SERPL-SCNC: 3.6 MMOL/L (ref 3.5–5.1)
POTASSIUM SERPL-SCNC: 3.9 MMOL/L (ref 3.5–5.1)
POTASSIUM SERPL-SCNC: 4 MMOL/L (ref 3.5–5.1)
POTASSIUM SERPL-SCNC: 4.1 MMOL/L (ref 3.5–5.1)
POTASSIUM SERPL-SCNC: 5.1 MMOL/L (ref 3.5–5.1)
PROT SERPL-MCNC: 8.2 G/DL (ref 6–8.4)
PROT UR QL STRIP: NEGATIVE
RBC # BLD AUTO: 4.68 M/UL (ref 4.6–6.2)
RBC # BLD AUTO: 5.07 M/UL (ref 4.6–6.2)
RBC #/AREA URNS HPF: 1 /HPF (ref 0–4)
SAMPLE: ABNORMAL
SITE: ABNORMAL
SODIUM SERPL-SCNC: 115 MMOL/L (ref 136–145)
SODIUM SERPL-SCNC: 126 MMOL/L (ref 136–145)
SODIUM SERPL-SCNC: 131 MMOL/L (ref 136–145)
SODIUM SERPL-SCNC: 134 MMOL/L (ref 136–145)
SODIUM SERPL-SCNC: 135 MMOL/L (ref 136–145)
SP GR UR STRIP: 1.03 (ref 1–1.03)
URN SPEC COLLECT METH UR: ABNORMAL
UROBILINOGEN UR STRIP-ACNC: NEGATIVE EU/DL
WBC # BLD AUTO: 10.37 K/UL (ref 3.9–12.7)
WBC # BLD AUTO: 10.88 K/UL (ref 3.9–12.7)
YEAST URNS QL MICRO: NORMAL

## 2024-02-21 PROCEDURE — 96366 THER/PROPH/DIAG IV INF ADDON: CPT

## 2024-02-21 PROCEDURE — 63600175 PHARM REV CODE 636 W HCPCS: Performed by: EMERGENCY MEDICINE

## 2024-02-21 PROCEDURE — 99285 EMERGENCY DEPT VISIT HI MDM: CPT | Mod: 25

## 2024-02-21 PROCEDURE — 96365 THER/PROPH/DIAG IV INF INIT: CPT

## 2024-02-21 PROCEDURE — S5010 5% DEXTROSE AND 0.45% SALINE: HCPCS | Performed by: EMERGENCY MEDICINE

## 2024-02-21 PROCEDURE — 99900035 HC TECH TIME PER 15 MIN (STAT)

## 2024-02-21 PROCEDURE — 80048 BASIC METABOLIC PNL TOTAL CA: CPT | Mod: 91,XB | Performed by: SPECIALIST

## 2024-02-21 PROCEDURE — 85025 COMPLETE CBC W/AUTO DIFF WBC: CPT | Mod: 91 | Performed by: NURSE PRACTITIONER

## 2024-02-21 PROCEDURE — 25000003 PHARM REV CODE 250: Performed by: NURSE PRACTITIONER

## 2024-02-21 PROCEDURE — 96361 HYDRATE IV INFUSION ADD-ON: CPT

## 2024-02-21 PROCEDURE — 63600175 PHARM REV CODE 636 W HCPCS: Performed by: NURSE PRACTITIONER

## 2024-02-21 PROCEDURE — 93005 ELECTROCARDIOGRAM TRACING: CPT

## 2024-02-21 PROCEDURE — 25000003 PHARM REV CODE 250: Performed by: EMERGENCY MEDICINE

## 2024-02-21 PROCEDURE — G0378 HOSPITAL OBSERVATION PER HR: HCPCS

## 2024-02-21 PROCEDURE — 82010 KETONE BODYS QUAN: CPT | Performed by: NURSE PRACTITIONER

## 2024-02-21 PROCEDURE — 96372 THER/PROPH/DIAG INJ SC/IM: CPT | Performed by: EMERGENCY MEDICINE

## 2024-02-21 PROCEDURE — 80053 COMPREHEN METABOLIC PANEL: CPT | Performed by: NURSE PRACTITIONER

## 2024-02-21 PROCEDURE — 96372 THER/PROPH/DIAG INJ SC/IM: CPT | Performed by: NURSE PRACTITIONER

## 2024-02-21 PROCEDURE — 83735 ASSAY OF MAGNESIUM: CPT | Performed by: NURSE PRACTITIONER

## 2024-02-21 PROCEDURE — 84100 ASSAY OF PHOSPHORUS: CPT | Performed by: EMERGENCY MEDICINE

## 2024-02-21 PROCEDURE — 93010 ELECTROCARDIOGRAM REPORT: CPT | Mod: ,,, | Performed by: INTERNAL MEDICINE

## 2024-02-21 PROCEDURE — 80048 BASIC METABOLIC PNL TOTAL CA: CPT | Mod: 91,XB | Performed by: NURSE PRACTITIONER

## 2024-02-21 PROCEDURE — 80048 BASIC METABOLIC PNL TOTAL CA: CPT | Mod: 91,XB | Performed by: EMERGENCY MEDICINE

## 2024-02-21 PROCEDURE — 81000 URINALYSIS NONAUTO W/SCOPE: CPT | Performed by: NURSE PRACTITIONER

## 2024-02-21 PROCEDURE — 83690 ASSAY OF LIPASE: CPT | Performed by: NURSE PRACTITIONER

## 2024-02-21 PROCEDURE — 36415 COLL VENOUS BLD VENIPUNCTURE: CPT | Performed by: NURSE PRACTITIONER

## 2024-02-21 PROCEDURE — 85025 COMPLETE CBC W/AUTO DIFF WBC: CPT | Performed by: NURSE PRACTITIONER

## 2024-02-21 PROCEDURE — 36415 COLL VENOUS BLD VENIPUNCTURE: CPT | Mod: XB | Performed by: EMERGENCY MEDICINE

## 2024-02-21 PROCEDURE — 82962 GLUCOSE BLOOD TEST: CPT

## 2024-02-21 PROCEDURE — 82803 BLOOD GASES ANY COMBINATION: CPT

## 2024-02-21 PROCEDURE — 96360 HYDRATION IV INFUSION INIT: CPT | Mod: 59

## 2024-02-21 RX ORDER — SODIUM CHLORIDE 9 MG/ML
1000 INJECTION, SOLUTION INTRAVENOUS CONTINUOUS
Status: ACTIVE | OUTPATIENT
Start: 2024-02-21 | End: 2024-02-21

## 2024-02-21 RX ORDER — IBUPROFEN 200 MG
16 TABLET ORAL
Status: DISCONTINUED | OUTPATIENT
Start: 2024-02-21 | End: 2024-02-24 | Stop reason: HOSPADM

## 2024-02-21 RX ORDER — LANOLIN ALCOHOL/MO/W.PET/CERES
800 CREAM (GRAM) TOPICAL
Status: DISCONTINUED | OUTPATIENT
Start: 2024-02-21 | End: 2024-02-24 | Stop reason: HOSPADM

## 2024-02-21 RX ORDER — FAMOTIDINE 20 MG/1
20 TABLET, FILM COATED ORAL 2 TIMES DAILY
Status: DISCONTINUED | OUTPATIENT
Start: 2024-02-21 | End: 2024-02-21

## 2024-02-21 RX ORDER — IBUPROFEN 200 MG
24 TABLET ORAL
Status: DISCONTINUED | OUTPATIENT
Start: 2024-02-21 | End: 2024-02-24 | Stop reason: HOSPADM

## 2024-02-21 RX ORDER — HEPARIN SODIUM 5000 [USP'U]/ML
5000 INJECTION, SOLUTION INTRAVENOUS; SUBCUTANEOUS EVERY 8 HOURS
Status: DISCONTINUED | OUTPATIENT
Start: 2024-02-21 | End: 2024-02-24 | Stop reason: HOSPADM

## 2024-02-21 RX ORDER — SODIUM CHLORIDE 0.9 % (FLUSH) 0.9 %
10 SYRINGE (ML) INJECTION
Status: DISCONTINUED | OUTPATIENT
Start: 2024-02-21 | End: 2024-02-21

## 2024-02-21 RX ORDER — ONDANSETRON HYDROCHLORIDE 2 MG/ML
8 INJECTION, SOLUTION INTRAVENOUS EVERY 6 HOURS PRN
Status: DISCONTINUED | OUTPATIENT
Start: 2024-02-21 | End: 2024-02-24 | Stop reason: HOSPADM

## 2024-02-21 RX ORDER — DEXTROSE MONOHYDRATE AND SODIUM CHLORIDE 5; .45 G/100ML; G/100ML
INJECTION, SOLUTION INTRAVENOUS CONTINUOUS PRN
Status: DISCONTINUED | OUTPATIENT
Start: 2024-02-21 | End: 2024-02-21

## 2024-02-21 RX ORDER — INSULIN ASPART 100 [IU]/ML
0-10 INJECTION, SOLUTION INTRAVENOUS; SUBCUTANEOUS
Status: DISCONTINUED | OUTPATIENT
Start: 2024-02-21 | End: 2024-02-23

## 2024-02-21 RX ORDER — MORPHINE SULFATE 4 MG/ML
4 INJECTION, SOLUTION INTRAMUSCULAR; INTRAVENOUS EVERY 4 HOURS PRN
Status: DISCONTINUED | OUTPATIENT
Start: 2024-02-21 | End: 2024-02-24 | Stop reason: HOSPADM

## 2024-02-21 RX ORDER — MUPIROCIN 20 MG/G
OINTMENT TOPICAL 2 TIMES DAILY
Status: DISCONTINUED | OUTPATIENT
Start: 2024-02-21 | End: 2024-02-24 | Stop reason: HOSPADM

## 2024-02-21 RX ORDER — ACETAMINOPHEN 325 MG/1
650 TABLET ORAL EVERY 6 HOURS PRN
Status: DISCONTINUED | OUTPATIENT
Start: 2024-02-21 | End: 2024-02-24 | Stop reason: HOSPADM

## 2024-02-21 RX ORDER — FAMOTIDINE 20 MG/1
20 TABLET, FILM COATED ORAL DAILY
Status: DISCONTINUED | OUTPATIENT
Start: 2024-02-21 | End: 2024-02-21

## 2024-02-21 RX ORDER — GLUCAGON 1 MG
1 KIT INJECTION
Status: DISCONTINUED | OUTPATIENT
Start: 2024-02-21 | End: 2024-02-24 | Stop reason: HOSPADM

## 2024-02-21 RX ADMIN — INSULIN ASPART 4 UNITS: 100 INJECTION, SOLUTION INTRAVENOUS; SUBCUTANEOUS at 09:02

## 2024-02-21 RX ADMIN — SODIUM CHLORIDE 0.2 UNITS/KG/HR: 9 INJECTION, SOLUTION INTRAVENOUS at 09:02

## 2024-02-21 RX ADMIN — SODIUM CHLORIDE 1000 ML: 0.9 INJECTION, SOLUTION INTRAVENOUS at 01:02

## 2024-02-21 RX ADMIN — HEPARIN SODIUM 5000 UNITS: 5000 INJECTION INTRAVENOUS; SUBCUTANEOUS at 09:02

## 2024-02-21 RX ADMIN — HEPARIN SODIUM 5000 UNITS: 5000 INJECTION INTRAVENOUS; SUBCUTANEOUS at 02:02

## 2024-02-21 RX ADMIN — SODIUM CHLORIDE 0.1 UNITS/KG/HR: 9 INJECTION, SOLUTION INTRAVENOUS at 03:02

## 2024-02-21 RX ADMIN — DEXTROSE AND SODIUM CHLORIDE: 5; 450 INJECTION, SOLUTION INTRAVENOUS at 10:02

## 2024-02-21 RX ADMIN — INSULIN DETEMIR 10 UNITS: 100 INJECTION, SOLUTION SUBCUTANEOUS at 07:02

## 2024-02-21 RX ADMIN — FAMOTIDINE 20 MG: 20 TABLET ORAL at 10:02

## 2024-02-21 RX ADMIN — SODIUM CHLORIDE 0.05 UNITS/KG/HR: 9 INJECTION, SOLUTION INTRAVENOUS at 05:02

## 2024-02-21 RX ADMIN — SODIUM CHLORIDE 1000 ML: 9 INJECTION, SOLUTION INTRAVENOUS at 05:02

## 2024-02-21 RX ADMIN — SODIUM CHLORIDE 1000 ML: 9 INJECTION, SOLUTION INTRAVENOUS at 01:02

## 2024-02-21 RX ADMIN — INSULIN HUMAN 10 UNITS: 100 INJECTION, SOLUTION PARENTERAL at 01:02

## 2024-02-21 RX ADMIN — SODIUM CHLORIDE, POTASSIUM CHLORIDE, SODIUM LACTATE AND CALCIUM CHLORIDE 2000 ML: 600; 310; 30; 20 INJECTION, SOLUTION INTRAVENOUS at 03:02

## 2024-02-21 RX ADMIN — MUPIROCIN: 20 OINTMENT TOPICAL at 09:02

## 2024-02-21 RX ADMIN — POTASSIUM BICARBONATE 50 MEQ: 978 TABLET, EFFERVESCENT ORAL at 05:02

## 2024-02-21 RX ADMIN — ACETAMINOPHEN 650 MG: 325 TABLET ORAL at 03:02

## 2024-02-21 RX ADMIN — HEPARIN SODIUM 5000 UNITS: 5000 INJECTION INTRAVENOUS; SUBCUTANEOUS at 05:02

## 2024-02-21 RX ADMIN — DEXTROSE AND SODIUM CHLORIDE: 5; 450 INJECTION, SOLUTION INTRAVENOUS at 05:02

## 2024-02-21 NOTE — HPI
Kvng Ba is a 53-year-old male with a past medical history of HTN, HLD, obesity, pre-diabetes that presented to the ER 2/21 after being sent per his PCP for Na 117, glucose of nearly 1000, malaise and thirst. Pt was found to be in DKA. He was given IVF and IV insulin. Pt was admitted to the ICU for DKA mgmt and was able to be transitioned to SQ insulin on that afternoon. Pt has done well overnight. SQ long acting insulin increased this AM given glucose readings in the 300s. Pt stable for transfer out of the ICU to the floor 2/22 under the care of [facility].

## 2024-02-21 NOTE — PROGRESS NOTES
Pharmacist Renal Dose Adjustment Note    Kvng Ba Jr. is a 53 y.o. male being treated with the medication famotidine.     Patient Data:    Vital Signs (Most Recent):  Temp: 97.9 °F (36.6 °C) (02/21/24 1523)  Pulse: 87 (02/21/24 1523)  Resp: 17 (02/21/24 1523)  BP: 128/69 (02/21/24 1523)  SpO2: 98 % (02/21/24 1523) Vital Signs (72h Range):  Temp:  [96 °F (35.6 °C)-98 °F (36.7 °C)]   Pulse:  []   Resp:  [16-22]   BP: ()/(53-95)   SpO2:  [94 %-98 %]      Recent Labs   Lab 02/21/24  0709 02/21/24  0923 02/21/24  1354   CREATININE 1.9* 1.6* 1.5*     Serum creatinine: 1.5 mg/dL (H) 02/21/24 1354  Estimated creatinine clearance: 79 mL/min (A)    Per protocol for CrCl > 50 ml/min, dose will be increased from 20 mg PO once daily to 20 mg PO twice daily.     Pharmacist's Name: Katherine E Mcardle  Pharmacist's Extension: [phone]

## 2024-02-21 NOTE — ASSESSMENT & PLAN NOTE
Former smoker, reports cessation 3 yrs ago  Encouraged continued cessation  No current exacerbation  Uses albuterol inh prn at home  PRN albuterol if needed

## 2024-02-21 NOTE — H&P
[facility] - Emergency Dept.  Critical Care Medicine  History & Physical    Patient Name: Kvng Ba Jr.  MRN: [id number]  Admission Date: 2/21/2024  Hospital Length of Stay: 0 days  Code Status: No Order  Attending Physician: Adri Bradshaw MD   Primary Care Provider: Blanco Love MD   Principal Problem: Diabetic ketoacidosis without coma associated with type 2 diabetes mellitus    Subjective:     HPI:  Kvng Ba is a 53-year-old male with a past medical history of HTN, HLD, obesity, pre-diabetes,     Hospital/ICU Course:  No notes on file     Past Medical History:   Diagnosis Date    ADHD (attention deficit hyperactivity disorder)     Anxiety     Chronic back pain     DJD (degenerative joint disease) of lumbar spine     GERD (gastroesophageal reflux disease)     High cholesterol        Past Surgical History:   Procedure Laterality Date    ARTHROPLASTY      R knee    COSMETIC SURGERY  1985    Nose broken and fixed    FEMUR FRACTURE SURGERY  01/01/2002    jose in place    FRACTURE SURGERY  1992    Broken rt femur    LUMBAR DISCECTOMY  01/01/2000    L4-L5    LUMBAR FUSION      L4-L5    SPINE SURGERY  2010    Spinal fusion L4/L5    TONSILLECTOMY  ?    Dont remeber date       Review of patient's allergies indicates:  No Known Allergies    Family History       Problem Relation (Age of Onset)    Cancer Father    Heart disease Mother    Hypertension Mother, Father    Leukemia Mother    Prostate cancer Father          Tobacco Use    Smoking status: Former     Current packs/day: 0.00     Average packs/day: 1.1 packs/day for 47.2 years (53.1 ttl pk-yrs)     Types: Cigarettes     Start date: 1/1/1985     Quit date: 5/30/2020     Years since quitting: 3.7    Smokeless tobacco: Never    Tobacco comments:     Quit 5/2020   Substance and Sexual Activity    Alcohol use: Not Currently     Comment: Drink two or three times a year    Drug use: No    Sexual activity: Not Currently         Review of Systems    Constitutional:  Positive for activity change and appetite change. Negative for chills, fever and unexpected weight change.   HENT:  Positive for sore throat.    Eyes: Negative.    Respiratory: Negative.     Cardiovascular: Negative.    Gastrointestinal:  Positive for abdominal pain and nausea. Negative for abdominal distention, constipation, diarrhea and vomiting.   Endocrine: Positive for polydipsia, polyphagia and polyuria.   Genitourinary: Negative.    Musculoskeletal: Negative.    Skin: Negative.    Neurological: Negative.      Objective:     Vital Signs (Most Recent):  Temp: 98 °F (36.7 °C) (02/21/24 0123)  Pulse: 104 (02/21/24 0154)  Resp: 19 (02/21/24 0154)  BP: 120/69 (02/21/24 0154)  SpO2: 95 % (02/21/24 0154) Vital Signs (24h Range):  Temp:  [96 °F (35.6 °C)-98 °F (36.7 °C)] 98 °F (36.7 °C)  Pulse:  [104-117] 104  Resp:  [19-20] 19  SpO2:  [95 %-97 %] 95 %  BP: (120-146)/(69-95) 120/69     Weight: 126.8 kg (279 lb 8.7 oz)  Body mass index is 37.91 kg/m².      Intake/Output Summary (Last 24 hours) at 2/21/2024 0440  Last data filed at 2/21/2024 0338  Gross per 24 hour   Intake 2000 ml   Output 1000 ml   Net 1000 ml        Physical Exam  Vitals and nursing note reviewed.   HENT:      Head: Normocephalic and atraumatic.      Mouth/Throat:      Mouth: Mucous membranes are moist.      Pharynx: Oropharynx is clear. No oropharyngeal exudate or posterior oropharyngeal erythema.   Eyes:      Extraocular Movements: Extraocular movements intact.      Pupils: Pupils are equal, round, and reactive to light.   Cardiovascular:      Rate and Rhythm: Regular rhythm. Tachycardia present.      Pulses: Normal pulses.      Heart sounds: Normal heart sounds.   Pulmonary:      Effort: Pulmonary effort is normal.      Breath sounds: Normal breath sounds.   Abdominal:      General: Abdomen is protuberant. Bowel sounds are normal.      Palpations: Abdomen is soft.      Tenderness: There is abdominal tenderness in the right upper  quadrant and epigastric area. There is no guarding or rebound.   Musculoskeletal:         General: Normal range of motion.      Cervical back: Normal range of motion and neck supple.   Skin:     General: Skin is warm and dry.      Capillary Refill: Capillary refill takes less than 2 seconds.   Neurological:      General: No focal deficit present.      Mental Status: He is alert and oriented to person, place, and time.      GCS: GCS eye subscore is 4. GCS verbal subscore is 5. GCS motor subscore is 6.   Psychiatric:         Behavior: Behavior is cooperative.          Vents:       Lines/Drains/Airways       Peripheral Intravenous Line  Duration                  Peripheral IV - Single Lumen 02/21/24 0130 18 G Anterior;Right Forearm <1 day         Peripheral IV - Single Lumen 02/21/24 0140 18 G Left Forearm <1 day                    Significant Labs:    CBC/Anemia Profile:  Recent Labs   Lab 02/21/24 0142   WBC 10.88   HGB 14.7   HCT 45.6      MCV 90   RDW 13.8        Chemistries:  Recent Labs   Lab 02/20/24  0808 02/21/24 0142   * 115*   K 4.8 5.1   CL 86* 83*   CO2 15* 13*   BUN 34* 34*   CREATININE 2.5* 2.8*   CALCIUM 10.3 9.4   ALBUMIN 3.8 3.7   PROT 8.4 8.2   BILITOT 1.1* 0.8   ALKPHOS 188* 185*   ALT 49* 39   AST 28 30     Hemoglobin A1C   Date Value Ref Range Status   02/20/2024 11.1 (H) 4.0 - 5.6 % Final     Comment:     ADA Screening Guidelines:  5.7-6.4%  Consistent with prediabetes  >or=6.5%  Consistent with diabetes    High levels of fetal hemoglobin interfere with the HbA1C  assay. Heterozygous hemoglobin variants (HbS, HgC, etc)do  not significantly interfere with this assay.   However, presence of multiple variants may affect accuracy.     07/27/2023 6.0 (H) 4.0 - 5.6 % Final     Comment:     ADA Screening Guidelines:  5.7-6.4%  Consistent with prediabetes  >or=6.5%  Consistent with diabetes    High levels of fetal hemoglobin interfere with the HbA1C  assay. Heterozygous hemoglobin variants  (HbS, HgC, etc)do  not significantly interfere with this assay.   However, presence of multiple variants may affect accuracy.       All pertinent labs within the past 24 hours have been reviewed.    Significant Imaging:   I have reviewed all pertinent imaging results/findings within the past 24 hours.  Assessment/Plan:     Pulmonary  Asthma with COPD  Former smoker, reports cessation 3 yrs ago  Encouraged continued cessation  No current exacerbation  Uses albuterol inh prn at home  PRN albuterol if needed      Cardiac/Vascular  Mixed hyperlipidemia  Triglycerides 1778 2/20/24, 6 months ago was in 600's  Monitor triglycerides  Lipid management TBD        Primary hypertension  Chronic, controlled. Latest blood pressure and vitals reviewed-     Temp:  [96 °F (35.6 °C)-98 °F (36.7 °C)]   Pulse:  [104-117]   Resp:  [19-20]   BP: (120-146)/(69-95)   SpO2:  [95 %-97 %] .   Home meds for hypertension were reviewed and noted below.   Hypertension Medications               olmesartan (BENICAR) 40 MG tablet Take 1 tablet (40 mg total) by mouth once daily.    propranoloL (INDERAL) 20 MG tablet Take 1 tablet (20 mg total) by mouth 2 (two) times daily.            While in the hospital, will manage blood pressure as follows-holding medications acutely, pt normotensive. Avoid ACEi/ARB in setting of BENIGNO    Will utilize p.r.n. blood pressure medication only if patient's blood pressure greater than 160/100 and he develops symptoms such as worsening chest pain or shortness of breath.    Renal/  BENIGNO (acute kidney injury)  Patient with acute kidney injury/acute renal failure likely due to pre-renal azotemia due to dehydration BENIGNO is currently worsening. Baseline creatinine  1.3  - Labs reviewed- Renal function/electrolytes with Estimated Creatinine Clearance: 42 mL/min (A) (based on SCr of 2.8 mg/dL (H)). according to latest data. Monitor urine output and serial BMP and adjust therapy as needed. Avoid nephrotoxins and renally dose meds  for GFR listed above.    Endocrine  * Diabetic ketoacidosis without coma associated with type 2 diabetes mellitus  Serum glucose >1100 on admit, BHB elevated  DKA protocol in place  BMP q4  Finger stick glucose hourly while on insulin gtt      Metabolic syndrome  As per NCEP ATP3 2005 criteria  Would benefit from outpatient follow-up/management    Type 2 diabetes mellitus  Patient's FSGs are uncontrolled due to hyperglycemia on current medication regimen.  Last A1c reviewed-   Lab Results   Component Value Date    HGBA1C 11.1 (H) 02/20/2024     Most recent fingerstick glucose reviewed-   Recent Labs   Lab 02/21/24  0122 02/21/24  0148   POCTGLUCOSE >500* >500*   A1C 11.0 on 2/20/24, 6 months ago was 6.0  Current correctional scale   insulin gtt  Follow DKA protocol  Hold oral hypoglycemics while patient is in the hospital.  Consult to diabetes educator placed    Severe obesity (BMI 35.0-39.9) with comorbidity  Body mass index is 37.91 kg/m². Morbid obesity complicates all aspects of disease management from diagnostic modalities to treatment. Weight loss encouraged and health benefits explained to patient. Encouraged outpatient follow-up to formulate plan of care.         GI  Acute pancreatitis  Likely in setting of hypertriglyceridemia  NPO other than ice/water/meds  PRN pain management    Other  RC (obstructive sleep apnea)  Frequently non-compliant with home CPAP  Encouraged to improve compliance and follow up w/provider outpt  CPAP QHS as tolerated/needed        Critical Care Daily Checklist:    A: Awake: RASS Goal/Actual Goal:    Actual:     B: Spontaneous Breathing Trial Performed?     C: SAT & SBT Coordinated?  N/a                      D: Delirium: CAM-ICU     E: Early Mobility Performed? Yes   F: Feeding Goal:    Status:     Current Diet Order   Procedures    Diet NPO Except for: Other (Comment) (water/ice freely (keep strict I/O))     Order Specific Question:   Except for     Answer:   Other (Comment)      Comments:   water/ice freely (keep strict I/O)      AS: Analgesia/Sedation Morphine   T: Thromboembolic Prophylaxis heparin   H: HOB > 300 Yes   U: Stress Ulcer Prophylaxis (if needed) Pepcid   G: Glucose Control Insulin gtt   B: Bowel Function     I: Indwelling Catheter (Lines & Barraza) Necessity N/a   D: De-escalation of Antimicrobials/Pharmacotherapies N/a    Plan for the day/ETD Optimize glucose    Code Status:  Family/Goals of Care: Full Code  Discharge home     Critical Care Time: 65 minutes  Critical secondary to high risk monitoring. Patient has a condition that poses threat to life and bodily function.    Critical care was time spent personally by me on the following activities: development of treatment plan with patient or surrogate and bedside caregivers, discussions with consultants, evaluation of patient's response to treatment, examination of patient, ordering and performing treatments and interventions, ordering and review of laboratory studies, ordering and review of radiographic studies, pulse oximetry, re-evaluation of patient's condition. This critical care time did not overlap with that of any other provider or involve time for any procedures.     Karen Markham NP  Critical Care Medicine  [facility] - Emergency Dept.

## 2024-02-21 NOTE — ED PROVIDER NOTES
SCRIBE #1 NOTE: I, Ibeth Fuchs, am scribing for, and in the presence of, Sudhakar Sun Jr., MD. I have scribed the entire note.       History     Chief Complaint   Patient presents with    Abnormal Labs     PCP referred him to ED. Na low (117) and Glucose almost 1000 (964). Generalized malaise and fatigue, thirsty.      Review of patient's allergies indicates:  No Known Allergies      History of Present Illness     HPI    2/21/2024, 1:33 AM  History obtained from the patient      History of Present Illness: Kvng Ba Jr. is a 53 y.o. male patient with a PMHx of HTN, high cholesterol who presents to the Emergency Department for evaluation per PCP for abnormal labs (low Na 117, high glucose 964). The patient reports that he takes Metformin for prediabetes. He is c/o polydipsia, polyuria, generalized abdominal pain, and fatigue. Symptoms are constant and moderate in severity. No mitigating or exacerbating factors reported. Patient denies any fever, chills, N/V, CP, SOB, and all other sxs at this time. No prior Tx reported. No further complaints or concerns at this time.       Arrival mode: Personal vehicle    PCP: Blanco Love MD        Past Medical History:  Past Medical History:   Diagnosis Date    ADHD (attention deficit hyperactivity disorder)     Anxiety     Chronic back pain     DJD (degenerative joint disease) of lumbar spine     GERD (gastroesophageal reflux disease)     High cholesterol        Past Surgical History:  Past Surgical History:   Procedure Laterality Date    ARTHROPLASTY      R knee    COSMETIC SURGERY  1985    Nose broken and fixed    FEMUR FRACTURE SURGERY  01/01/2002    jose in place    FRACTURE SURGERY  1992    Broken rt femur    LUMBAR DISCECTOMY  01/01/2000    L4-L5    LUMBAR FUSION      L4-L5    SPINE SURGERY  2010    Spinal fusion L4/L5    TONSILLECTOMY  ?    Dont remeber date         Family History:  Family History   Problem Relation Age of Onset    Hypertension Mother      Heart disease Mother     Leukemia Mother     Hypertension Father     Prostate cancer Father     Cancer Father     Colon cancer Neg Hx        Social History:  Social History     Tobacco Use    Smoking status: Former     Current packs/day: 0.00     Average packs/day: 1.1 packs/day for 47.2 years (53.1 ttl pk-yrs)     Types: Cigarettes     Start date: 1/1/1985     Quit date: 5/30/2020     Years since quitting: 3.7    Smokeless tobacco: Never    Tobacco comments:     Quit 5/2020   Substance and Sexual Activity    Alcohol use: Not Currently     Comment: Drink two or three times a year    Drug use: No    Sexual activity: Not Currently        Review of Systems     Review of Systems   Constitutional:  Negative for chills and fever.   HENT:  Negative for sore throat.    Respiratory:  Negative for shortness of breath.    Cardiovascular:  Negative for chest pain.   Gastrointestinal:  Positive for abdominal pain (generalized). Negative for nausea and vomiting.   Endocrine: Positive for polydipsia and polyuria.   Genitourinary:  Negative for dysuria.   Musculoskeletal:  Negative for back pain.   Skin:  Negative for rash.   Neurological:  Negative for weakness.   Hematological:  Does not bruise/bleed easily.   All other systems reviewed and are negative.     Physical Exam     Initial Vitals   BP Pulse Resp Temp SpO2   02/21/24 0121 02/21/24 0121 02/21/24 0121 02/21/24 0123 02/21/24 0121   (!) 146/95 (!) 116 20 98 °F (36.7 °C) 95 %      MAP       --                 Physical Exam  Nursing Notes and Vital Signs Reviewed.  Constitutional: Patient is in no acute distress. Well-developed and well-nourished.  Head: Atraumatic. Normocephalic.  Eyes:  EOM intact.  No scleral icterus.  ENT: Mucous membranes are dry.  Nares clear   Neck:  Full ROM. No JVD.  Cardiovascular: Regular rate. Regular rhythm No murmurs, rubs, or gallops. Distal pulses are 2+ and symmetric  Pulmonary/Chest: No respiratory distress. Clear to auscultation  bilaterally. No wheezing or rales.  Equal chest wall rise bilaterally  Abdominal: Soft and non-distended.  There is no tenderness.  No rebound, guarding, or rigidity. Good bowel sounds.  Genitourinary: No CVA tenderness.  No suprapubic tenderness  Musculoskeletal: Moves all extremities. No obvious deformities.  5 x 5 strength in all extremities   Skin: Warm and dry.  Neurological:  Alert, awake, and appropriate.  Normal speech.  No acute focal neurological deficits are appreciated.  Two through 12 intact bilaterally.  Psychiatric: Normal affect. Good eye contact. Appropriate in content.     ED Course   Critical Care    Date/Time: 2/21/2024 2:18 AM    Performed by: Sudhakar Sun Jr., MD  Authorized by: Sudhakar Sun Jr., MD  Direct patient critical care time: 12 minutes  Additional history critical care time: 7 minutes  Ordering / reviewing critical care time: 8 minutes  Documentation critical care time: 10 minutes  Consulting other physicians critical care time: 5 minutes  Consult with family critical care time: 5 minutes  Total critical care time (exclusive of procedural time) : 47 minutes  Critical care time was exclusive of separately billable procedures and treating other patients and teaching time.  Critical care was necessary to treat or prevent imminent or life-threatening deterioration of the following conditions: metabolic crisis and endocrine crisis.  Critical care was time spent personally by me on the following activities: discussions with consultants, development of treatment plan with patient or surrogate, interpretation of cardiac output measurements, evaluation of patient's response to treatment, examination of patient, obtaining history from patient or surrogate, ordering and performing treatments and interventions, ordering and review of laboratory studies, ordering and review of radiographic studies, pulse oximetry, re-evaluation of patient's condition and review of old charts.        ED  Vital Signs:  Vitals:    02/21/24 0121 02/21/24 0123 02/21/24 0154   BP: (!) 146/95  120/69   Pulse: (!) 116  104   Resp: 20  19   Temp:  98 °F (36.7 °C)    TempSrc: Oral Oral    SpO2: 95%  95%   Weight: 126.8 kg (279 lb 8.7 oz)         Abnormal Lab Results:  Labs Reviewed   CBC W/ AUTO DIFFERENTIAL - Abnormal; Notable for the following components:       Result Value    Immature Granulocytes 0.6 (*)     Immature Grans (Abs) 0.06 (*)     All other components within normal limits   COMPREHENSIVE METABOLIC PANEL - Abnormal; Notable for the following components:    Sodium 115 (*)     Chloride 83 (*)     CO2 13 (*)     Glucose 1,134 (*)     BUN 34 (*)     Creatinine 2.8 (*)     Alkaline Phosphatase 185 (*)     eGFR 26 (*)     Anion Gap 19 (*)     All other components within normal limits    Narrative:     glucose critical result(s) called and verbal readback obtained from   charu cat rn by KAT5 02/21/2024 02:33  sodium critical result(s) called and verbal readback obtained from   juan wilde rn by KAT5 02/21/2024 02:26   BETA - HYDROXYBUTYRATE, SERUM - Abnormal; Notable for the following components:    Beta-Hydroxybutyrate 1.7 (*)     All other components within normal limits   URINALYSIS, REFLEX TO URINE CULTURE - Abnormal; Notable for the following components:    Color, UA Colorless (*)     Glucose, UA 4+ (*)     Ketones, UA 1+ (*)     All other components within normal limits    Narrative:     Specimen Source->Urine   POCT GLUCOSE - Abnormal; Notable for the following components:    POCT Glucose >500 (*)     All other components within normal limits   ISTAT PROCEDURE - Abnormal; Notable for the following components:    POC PH 7.308 (*)     POC PO2 37 (*)     POC HCO3 21.1 (*)     POC BE -5 (*)     All other components within normal limits   POCT GLUCOSE - Abnormal; Notable for the following components:    POCT Glucose >500 (*)     All other components within normal limits   URINALYSIS MICROSCOPIC    Narrative:      Specimen Source->Urine   LIPASE   BASIC METABOLIC PANEL   PHOSPHORUS   BASIC METABOLIC PANEL   PHOSPHORUS   MAGNESIUM   LIPASE   POCT GLUCOSE MONITORING CONTINUOUS   POCT GLUCOSE MONITORING CONTINUOUS        All Lab Results:  Results for orders placed or performed during the hospital encounter of 02/21/24   CBC auto differential   Result Value Ref Range    WBC 10.88 3.90 - 12.70 K/uL    RBC 5.07 4.60 - 6.20 M/uL    Hemoglobin 14.7 14.0 - 18.0 g/dL    Hematocrit 45.6 40.0 - 54.0 %    MCV 90 82 - 98 fL    MCH 29.0 27.0 - 31.0 pg    MCHC 32.2 32.0 - 36.0 g/dL    RDW 13.8 11.5 - 14.5 %    Platelets 274 150 - 450 K/uL    MPV 10.9 9.2 - 12.9 fL    Immature Granulocytes 0.6 (H) 0.0 - 0.5 %    Gran # (ANC) 7.0 1.8 - 7.7 K/uL    Immature Grans (Abs) 0.06 (H) 0.00 - 0.04 K/uL    Lymph # 2.8 1.0 - 4.8 K/uL    Mono # 0.7 0.3 - 1.0 K/uL    Eos # 0.2 0.0 - 0.5 K/uL    Baso # 0.08 0.00 - 0.20 K/uL    nRBC 0 0 /100 WBC    Gran % 64.4 38.0 - 73.0 %    Lymph % 25.7 18.0 - 48.0 %    Mono % 6.8 4.0 - 15.0 %    Eosinophil % 1.8 0.0 - 8.0 %    Basophil % 0.7 0.0 - 1.9 %    Differential Method Automated    Comprehensive metabolic panel   Result Value Ref Range    Sodium 115 (LL) 136 - 145 mmol/L    Potassium 5.1 3.5 - 5.1 mmol/L    Chloride 83 (L) 95 - 110 mmol/L    CO2 13 (L) 23 - 29 mmol/L    Glucose 1,134 (HH) 70 - 110 mg/dL    BUN 34 (H) 6 - 20 mg/dL    Creatinine 2.8 (H) 0.5 - 1.4 mg/dL    Calcium 9.4 8.7 - 10.5 mg/dL    Total Protein 8.2 6.0 - 8.4 g/dL    Albumin 3.7 3.5 - 5.2 g/dL    Total Bilirubin 0.8 0.1 - 1.0 mg/dL    Alkaline Phosphatase 185 (H) 55 - 135 U/L    AST 30 10 - 40 U/L    ALT 39 10 - 44 U/L    eGFR 26 (A) >60 mL/min/1.73 m^2    Anion Gap 19 (H) 8 - 16 mmol/L   Beta - Hydroxybutyrate, Serum   Result Value Ref Range    Beta-Hydroxybutyrate 1.7 (H) 0.0 - 0.5 mmol/L   Urinalysis, Reflex to Urine Culture Urine, Clean Catch    Specimen: Urine   Result Value Ref Range    Specimen UA Urine, Clean Catch     Color, UA  Colorless (A) Yellow, Straw, Rossi    Appearance, UA Clear Clear    pH, UA 5.0 5.0 - 8.0    Specific Gravity, UA 1.030 1.005 - 1.030    Protein, UA Negative Negative    Glucose, UA 4+ (A) Negative    Ketones, UA 1+ (A) Negative    Bilirubin (UA) Negative Negative    Occult Blood UA Negative Negative    Nitrite, UA Negative Negative    Urobilinogen, UA Negative <2.0 EU/dL    Leukocytes, UA Negative Negative   Urinalysis Microscopic   Result Value Ref Range    RBC, UA 1 0 - 4 /hpf    Bacteria None None-Occ /hpf    Yeast, UA None None    Microscopic Comment SEE COMMENT    POCT glucose   Result Value Ref Range    POCT Glucose >500 (HH) 70 - 110 mg/dL   ISTAT PROCEDURE   Result Value Ref Range    POC PH 7.308 (L) 7.35 - 7.45    POC PCO2 42.2 35 - 45 mmHg    POC PO2 37 (LL) 40 - 60 mmHg    POC HCO3 21.1 (L) 24 - 28 mmol/L    POC BE -5 (L) -2 to 2 mmol/L    POC SATURATED O2 65 95 - 100 %    Sample VENOUS     Site Other     Allens Test N/A    POCT glucose   Result Value Ref Range    POCT Glucose >500 (HH) 70 - 110 mg/dL         Imaging Results:  Imaging Results              X-Ray Chest AP Portable (Preliminary result)  Result time 02/21/24 02:16:46      Wet Read by Sudhakar Sun Jr., MD (02/21/24 02:16:46, '[facility] - Emergency Dept., Emergency Medicine)    No acute findings                                     The EKG was ordered, reviewed, and independently interpreted by the ED provider.  Interpretation time: 01:42  Rate: 107 BPM  Rhythm: sinus tachycardia  Interpretation: No acute ST changes. No STEMI.           The Emergency Provider reviewed the vital signs and test results, which are outlined above.     ED Discussion     2:44 AM: Discussed case with Karen Markham NP (Critical Care). Dr. Bradshaw agrees with current care and management of pt and accepts admission.   Admitting Service: ICU  Admitting Physician: Dr. Tariq  Admit to: ICU    2:44 AM: Re-evaluated pt. I have discussed test results, shared treatment  plan, and the need for admission with patient and family at bedside. Pt and family express understanding at this time and agree with all information. All questions answered. Pt and family have no further questions or concerns at this time. Pt is ready for admit.         Medical Decision Making  Differential diagnosis:  Hyperglycemia, DKA, new onset diabetes, hyperosmolar nonketotic syndrome    Patient presents with a blood sugar of her 1000 with a elevated beta hydroxybutyrate pH of 7.3 and a wide anion gap.  This consistent with diabetic ketoacidosis.  He has been treated with fluids subcu insulin.  Insulin drip has been initiated and critical care team will be admitting.  Discussed all findings with the patient was well as plan of care.  Verbalized agreement understanding with all instructions seems reliable.    Amount and/or Complexity of Data Reviewed  Independent Historian: spouse  External Data Reviewed: labs and notes.  Labs: ordered. Decision-making details documented in ED Course.  Radiology: ordered and independent interpretation performed. Decision-making details documented in ED Course.  ECG/medicine tests: ordered and independent interpretation performed. Decision-making details documented in ED Course.    Risk  OTC drugs.  Prescription drug management.  Drug therapy requiring intensive monitoring for toxicity.  Decision regarding hospitalization.    Critical Care  Total time providing critical care: 47 minutes                ED Medication(s):  Medications   sodium chloride 0.9% flush 10 mL (has no administration in time range)   0.9%  NaCl infusion (has no administration in time range)   dextrose 5 % and 0.45 % NaCl infusion (has no administration in time range)   insulin regular 1 Units/mL in sodium chloride 0.9% 100 mL infusion (has no administration in time range)   dextrose 10% bolus 125 mL 125 mL (has no administration in time range)   dextrose 10% bolus 250 mL 250 mL (has no administration in  time range)   sodium chloride 0.9% bolus 1,000 mL 1,000 mL (1,000 mLs Intravenous New Bag 2/21/24 0130)   sodium chloride 0.9% bolus 1,000 mL 1,000 mL (1,000 mLs Intravenous New Bag 2/21/24 0149)   insulin regular injection 10 Units 0.1 mL (10 Units Subcutaneous Given 2/21/24 0149)       New Prescriptions    No medications on file               Scribe Attestation:   Scribe #1: I performed the above scribed service and the documentation accurately describes the services I performed. I attest to the accuracy of the note.     Attending:   Physician Attestation Statement for Scribe #1: I, Sudhakar Sun Jr., MD, personally performed the services described in this documentation, as scribed by Ibeth Fuchs, in my presence, and it is both accurate and complete.           Clinical Impression       ICD-10-CM ICD-9-CM   1. Diabetic ketoacidosis without coma associated with type 2 diabetes mellitus  E11.10 250.12   2. Hyperglycemia  R73.9 790.29       Disposition:   Disposition: Admitted  Condition: Critical         Sudhakar Sun Jr., MD  02/21/24 0253

## 2024-02-21 NOTE — Clinical Note
Diagnosis: Diabetic ketoacidosis without coma associated with type 2 diabetes mellitus [[id number]]   Future Attending Provider: MARGARET AGUILAR [[id number]]   Reason for IP Medical Treatment  (Clinical interventions that can only be accomplished in the IP setting? ) :: DKA   I certify that Inpatient services for greater than or equal to 2 midnights are medically necessary:: Yes   Plans for Post-Acute care--if anticipated (pick the single best option):: A. No post acute care anticipated at this time

## 2024-02-21 NOTE — TELEPHONE ENCOUNTER
Notified of critical glucose of 969 and sodium 117.    Chart reviewed.    Attempted to call patient x 2, there was no answer.  Attempted to contact sister (alternate contact).    Spoke with Christiane Ba. She lives with patient. Advised of abnormal labs and advised to present to ED immediately for evaluation/treatment. She expressed understanding and agreement with plan.

## 2024-02-21 NOTE — ASSESSMENT & PLAN NOTE
Body mass index is 37.91 kg/m². Morbid obesity complicates all aspects of disease management from diagnostic modalities to treatment. Weight loss encouraged and health benefits explained to patient. Encouraged outpatient follow-up to formulate plan of care.

## 2024-02-21 NOTE — ASSESSMENT & PLAN NOTE
Frequently non-compliant with home CPAP  Encouraged to improve compliance and follow up w/provider outpt  CPAP QHS as tolerated/needed

## 2024-02-21 NOTE — CONSULTS
Patient admitted with DKA. Consulted for diabetes education. Patient states he has a history of pre diabetes and take metformin at home. States he does miss on average 1 out of every 3 doses. A1C level was 11.1 on 2/20/24 which increased from the 6.0 on 7/27/23. Patient states he is tired at this time. Educational handouts left at bedside and will let patient rest and follow up with patient tomorrow.

## 2024-02-21 NOTE — ASSESSMENT & PLAN NOTE
Triglycerides 1778 2/20/24, 6 months ago was in 600's  Monitor triglycerides  Lipid management TBD

## 2024-02-21 NOTE — ASSESSMENT & PLAN NOTE
Patient with acute kidney injury/acute renal failure likely due to pre-renal azotemia due to dehydration BENIGNO is currently worsening. Baseline creatinine  1.3  - Labs reviewed- Renal function/electrolytes with Estimated Creatinine Clearance: 42 mL/min (A) (based on SCr of 2.8 mg/dL (H)). according to latest data. Monitor urine output and serial BMP and adjust therapy as needed. Avoid nephrotoxins and renally dose meds for GFR listed above.

## 2024-02-21 NOTE — PHARMACY MED REC
"Admission Medication History     The home medication history was taken by Rebeka Isidro.    You may go to "Admission" then "Reconcile Home Medications" tabs to review and/or act upon these items.     The home medication list has been updated by the Pharmacy department.   Please read ALL comments highlighted in yellow.   Please address this information as you see fit.    Feel free to contact us if you have any questions or require assistance.        Medications listed below were obtained from: Patient/family and Analytic software- [employer]  (Not in a hospital admission)      LAST [facility] REC COMPLETED:     Rebekayamilka Isidro  [id number]      Current Outpatient Medications on File Prior to Encounter   Medication Sig Dispense Refill Last Dose    albuterol (PROVENTIL/VENTOLIN HFA) 90 mcg/actuation inhaler Inhale 2 puffs into the lungs every 4 (four) hours as needed for Wheezing or Shortness of Breath. 18 g 11 Past Week    meloxicam (MOBIC) 15 MG tablet Take 15 mg by mouth.   Past Week    metFORMIN (GLUCOPHAGE) 500 MG tablet Take 1 tablet (500 mg total) by mouth daily with breakfast. 90 tablet 3 Past Week    olmesartan (BENICAR) 40 MG tablet Take 1 tablet (40 mg total) by mouth once daily. 90 tablet 3 Past Week    omega-3 acid ethyl esters (LOVAZA) 1 gram capsule Take 2 capsules (2 g total) by mouth 2 (two) times daily. 360 capsule 3 Past Week    propranoloL (INDERAL) 20 MG tablet Take 1 tablet (20 mg total) by mouth 2 (two) times daily. 60 tablet 0 Past Week    avanafiL (STENDRA) 200 mg Tab Take 200 mg by mouth daily as needed (sexual activity). 6 tablet 11 Unknown                           .          "

## 2024-02-21 NOTE — ASSESSMENT & PLAN NOTE
Chronic, controlled. Latest blood pressure and vitals reviewed-     Temp:  [96 °F (35.6 °C)-98 °F (36.7 °C)]   Pulse:  [104-117]   Resp:  [19-20]   BP: (120-146)/(69-95)   SpO2:  [95 %-97 %] .   Home meds for hypertension were reviewed and noted below.   Hypertension Medications               olmesartan (BENICAR) 40 MG tablet Take 1 tablet (40 mg total) by mouth once daily.    propranoloL (INDERAL) 20 MG tablet Take 1 tablet (20 mg total) by mouth 2 (two) times daily.            While in the hospital, will manage blood pressure as follows-holding medications acutely, pt normotensive. Avoid ACEi/ARB in setting of BENIGNO    Will utilize p.r.n. blood pressure medication only if patient's blood pressure greater than 160/100 and he develops symptoms such as worsening chest pain or shortness of breath.

## 2024-02-21 NOTE — ASSESSMENT & PLAN NOTE
Serum glucose >1100 on admit, BHB elevated  DKA protocol in place  BMP q4  Finger stick glucose hourly while on insulin gtt

## 2024-02-21 NOTE — SUBJECTIVE & OBJECTIVE
Past Medical History:   Diagnosis Date    ADHD (attention deficit hyperactivity disorder)     Anxiety     Chronic back pain     DJD (degenerative joint disease) of lumbar spine     GERD (gastroesophageal reflux disease)     High cholesterol        Past Surgical History:   Procedure Laterality Date    ARTHROPLASTY      R knee    COSMETIC SURGERY  1985    Nose broken and fixed    FEMUR FRACTURE SURGERY  01/01/2002    jose in place    FRACTURE SURGERY  1992    Broken rt femur    LUMBAR DISCECTOMY  01/01/2000    L4-L5    LUMBAR FUSION      L4-L5    SPINE SURGERY  2010    Spinal fusion L4/L5    TONSILLECTOMY  ?    Dont remeber date       Review of patient's allergies indicates:  No Known Allergies    Family History       Problem Relation (Age of Onset)    Cancer Father    Heart disease Mother    Hypertension Mother, Father    Leukemia Mother    Prostate cancer Father          Tobacco Use    Smoking status: Former     Current packs/day: 0.00     Average packs/day: 1.1 packs/day for 47.2 years (53.1 ttl pk-yrs)     Types: Cigarettes     Start date: 1/1/1985     Quit date: 5/30/2020     Years since quitting: 3.7    Smokeless tobacco: Never    Tobacco comments:     Quit 5/2020   Substance and Sexual Activity    Alcohol use: Not Currently     Comment: Drink two or three times a year    Drug use: No    Sexual activity: Not Currently         Review of Systems   Constitutional:  Positive for activity change and appetite change. Negative for chills, fever and unexpected weight change.   HENT:  Positive for sore throat.    Eyes: Negative.    Respiratory: Negative.     Cardiovascular: Negative.    Gastrointestinal:  Positive for abdominal pain and nausea. Negative for abdominal distention, constipation, diarrhea and vomiting.   Endocrine: Positive for polydipsia, polyphagia and polyuria.   Genitourinary: Negative.    Musculoskeletal: Negative.    Skin: Negative.    Neurological: Negative.      Objective:     Vital Signs (Most  Recent):  Temp: 98 °F (36.7 °C) (02/21/24 0123)  Pulse: 104 (02/21/24 0154)  Resp: 19 (02/21/24 0154)  BP: 120/69 (02/21/24 0154)  SpO2: 95 % (02/21/24 0154) Vital Signs (24h Range):  Temp:  [96 °F (35.6 °C)-98 °F (36.7 °C)] 98 °F (36.7 °C)  Pulse:  [104-117] 104  Resp:  [19-20] 19  SpO2:  [95 %-97 %] 95 %  BP: (120-146)/(69-95) 120/69     Weight: 126.8 kg (279 lb 8.7 oz)  Body mass index is 37.91 kg/m².      Intake/Output Summary (Last 24 hours) at 2/21/2024 0440  Last data filed at 2/21/2024 0338  Gross per 24 hour   Intake 2000 ml   Output 1000 ml   Net 1000 ml        Physical Exam  Vitals and nursing note reviewed.   HENT:      Head: Normocephalic and atraumatic.      Mouth/Throat:      Mouth: Mucous membranes are moist.      Pharynx: Oropharynx is clear. No oropharyngeal exudate or posterior oropharyngeal erythema.   Eyes:      Extraocular Movements: Extraocular movements intact.      Pupils: Pupils are equal, round, and reactive to light.   Cardiovascular:      Rate and Rhythm: Regular rhythm. Tachycardia present.      Pulses: Normal pulses.      Heart sounds: Normal heart sounds.   Pulmonary:      Effort: Pulmonary effort is normal.      Breath sounds: Normal breath sounds.   Abdominal:      General: Abdomen is protuberant. Bowel sounds are normal.      Palpations: Abdomen is soft.      Tenderness: There is abdominal tenderness in the right upper quadrant and epigastric area. There is no guarding or rebound.   Musculoskeletal:         General: Normal range of motion.      Cervical back: Normal range of motion and neck supple.   Skin:     General: Skin is warm and dry.      Capillary Refill: Capillary refill takes less than 2 seconds.   Neurological:      General: No focal deficit present.      Mental Status: He is alert and oriented to person, place, and time.      GCS: GCS eye subscore is 4. GCS verbal subscore is 5. GCS motor subscore is 6.   Psychiatric:         Behavior: Behavior is cooperative.           Vents:       Lines/Drains/Airways       Peripheral Intravenous Line  Duration                  Peripheral IV - Single Lumen 02/21/24 0130 18 G Anterior;Right Forearm <1 day         Peripheral IV - Single Lumen 02/21/24 0140 18 G Left Forearm <1 day                    Significant Labs:    CBC/Anemia Profile:  Recent Labs   Lab 02/21/24  0142   WBC 10.88   HGB 14.7   HCT 45.6      MCV 90   RDW 13.8        Chemistries:  Recent Labs   Lab 02/20/24  0808 02/21/24 0142   * 115*   K 4.8 5.1   CL 86* 83*   CO2 15* 13*   BUN 34* 34*   CREATININE 2.5* 2.8*   CALCIUM 10.3 9.4   ALBUMIN 3.8 3.7   PROT 8.4 8.2   BILITOT 1.1* 0.8   ALKPHOS 188* 185*   ALT 49* 39   AST 28 30     Hemoglobin A1C   Date Value Ref Range Status   02/20/2024 11.1 (H) 4.0 - 5.6 % Final     Comment:     ADA Screening Guidelines:  5.7-6.4%  Consistent with prediabetes  >or=6.5%  Consistent with diabetes    High levels of fetal hemoglobin interfere with the HbA1C  assay. Heterozygous hemoglobin variants (HbS, HgC, etc)do  not significantly interfere with this assay.   However, presence of multiple variants may affect accuracy.     07/27/2023 6.0 (H) 4.0 - 5.6 % Final     Comment:     ADA Screening Guidelines:  5.7-6.4%  Consistent with prediabetes  >or=6.5%  Consistent with diabetes    High levels of fetal hemoglobin interfere with the HbA1C  assay. Heterozygous hemoglobin variants (HbS, HgC, etc)do  not significantly interfere with this assay.   However, presence of multiple variants may affect accuracy.       All pertinent labs within the past 24 hours have been reviewed.    Significant Imaging:   I have reviewed all pertinent imaging results/findings within the past 24 hours.

## 2024-02-21 NOTE — ASSESSMENT & PLAN NOTE
Patient's FSGs are uncontrolled due to hyperglycemia on current medication regimen.  Last A1c reviewed-   Lab Results   Component Value Date    HGBA1C 11.1 (H) 02/20/2024     Most recent fingerstick glucose reviewed-   Recent Labs   Lab 02/21/24  0122 02/21/24  0148   POCTGLUCOSE >500* >500*   A1C 11.0 on 2/20/24, 6 months ago was 6.0  Current correctional scale   insulin gtt  Follow DKA protocol  Hold oral hypoglycemics while patient is in the hospital.  Consult to diabetes educator placed

## 2024-02-21 NOTE — PLAN OF CARE
Pts VS stable since arrival to ICU. Afebrile. SR via monitor. AAOx4. Room air; no issues. Voids via urinal. Normotensive. Insulin drip and IVF's infusing as ordered; see MAR. Pt updated on status and plan of care.

## 2024-02-22 DIAGNOSIS — J44.89 ASTHMA WITH COPD: Primary | Chronic | ICD-10-CM

## 2024-02-22 LAB
ANION GAP SERPL CALC-SCNC: 13 MMOL/L (ref 8–16)
BUN SERPL-MCNC: 22 MG/DL (ref 6–20)
CALCIUM SERPL-MCNC: 8.8 MG/DL (ref 8.7–10.5)
CHLORIDE SERPL-SCNC: 104 MMOL/L (ref 95–110)
CO2 SERPL-SCNC: 17 MMOL/L (ref 23–29)
CREAT SERPL-MCNC: 1.7 MG/DL (ref 0.5–1.4)
EST. GFR  (NO RACE VARIABLE): 48 ML/MIN/1.73 M^2
GLUCOSE SERPL-MCNC: 351 MG/DL (ref 70–110)
LIPASE SERPL-CCNC: 96 U/L (ref 4–60)
MAGNESIUM SERPL-MCNC: 2.1 MG/DL (ref 1.6–2.6)
PHOSPHATE SERPL-MCNC: 2.3 MG/DL (ref 2.7–4.5)
POCT GLUCOSE: 340 MG/DL (ref 70–110)
POCT GLUCOSE: 356 MG/DL (ref 70–110)
POCT GLUCOSE: 418 MG/DL (ref 70–110)
POCT GLUCOSE: 444 MG/DL (ref 70–110)
POTASSIUM SERPL-SCNC: 4.2 MMOL/L (ref 3.5–5.1)
SODIUM SERPL-SCNC: 134 MMOL/L (ref 136–145)

## 2024-02-22 PROCEDURE — 63600175 PHARM REV CODE 636 W HCPCS: Performed by: NURSE PRACTITIONER

## 2024-02-22 PROCEDURE — G0378 HOSPITAL OBSERVATION PER HR: HCPCS

## 2024-02-22 PROCEDURE — 36415 COLL VENOUS BLD VENIPUNCTURE: CPT

## 2024-02-22 PROCEDURE — 25000003 PHARM REV CODE 250: Performed by: NURSE PRACTITIONER

## 2024-02-22 PROCEDURE — 94761 N-INVAS EAR/PLS OXIMETRY MLT: CPT

## 2024-02-22 PROCEDURE — 83735 ASSAY OF MAGNESIUM: CPT

## 2024-02-22 PROCEDURE — 83690 ASSAY OF LIPASE: CPT

## 2024-02-22 PROCEDURE — 96372 THER/PROPH/DIAG INJ SC/IM: CPT | Performed by: NURSE PRACTITIONER

## 2024-02-22 PROCEDURE — 96361 HYDRATE IV INFUSION ADD-ON: CPT

## 2024-02-22 PROCEDURE — 25000003 PHARM REV CODE 250: Performed by: EMERGENCY MEDICINE

## 2024-02-22 PROCEDURE — 84100 ASSAY OF PHOSPHORUS: CPT

## 2024-02-22 PROCEDURE — 80048 BASIC METABOLIC PNL TOTAL CA: CPT

## 2024-02-22 RX ORDER — SODIUM CHLORIDE 9 MG/ML
INJECTION, SOLUTION INTRAVENOUS CONTINUOUS
Status: DISCONTINUED | OUTPATIENT
Start: 2024-02-22 | End: 2024-02-23

## 2024-02-22 RX ORDER — PROPRANOLOL HYDROCHLORIDE 20 MG/1
20 TABLET ORAL 2 TIMES DAILY
Status: DISCONTINUED | OUTPATIENT
Start: 2024-02-22 | End: 2024-02-24 | Stop reason: HOSPADM

## 2024-02-22 RX ADMIN — PROPRANOLOL HYDROCHLORIDE 20 MG: 20 TABLET ORAL at 08:02

## 2024-02-22 RX ADMIN — INSULIN ASPART 4 UNITS: 100 INJECTION, SOLUTION INTRAVENOUS; SUBCUTANEOUS at 09:02

## 2024-02-22 RX ADMIN — MUPIROCIN: 20 OINTMENT TOPICAL at 09:02

## 2024-02-22 RX ADMIN — INSULIN ASPART 10 UNITS: 100 INJECTION, SOLUTION INTRAVENOUS; SUBCUTANEOUS at 04:02

## 2024-02-22 RX ADMIN — HEPARIN SODIUM 5000 UNITS: 5000 INJECTION INTRAVENOUS; SUBCUTANEOUS at 05:02

## 2024-02-22 RX ADMIN — SODIUM CHLORIDE: 9 INJECTION, SOLUTION INTRAVENOUS at 06:02

## 2024-02-22 RX ADMIN — ACETAMINOPHEN 650 MG: 325 TABLET ORAL at 02:02

## 2024-02-22 RX ADMIN — PROPRANOLOL HYDROCHLORIDE 20 MG: 20 TABLET ORAL at 09:02

## 2024-02-22 RX ADMIN — HEPARIN SODIUM 5000 UNITS: 5000 INJECTION INTRAVENOUS; SUBCUTANEOUS at 09:02

## 2024-02-22 RX ADMIN — INSULIN ASPART 10 UNITS: 100 INJECTION, SOLUTION INTRAVENOUS; SUBCUTANEOUS at 11:02

## 2024-02-22 RX ADMIN — INSULIN ASPART 10 UNITS: 100 INJECTION, SOLUTION INTRAVENOUS; SUBCUTANEOUS at 05:02

## 2024-02-22 RX ADMIN — INSULIN ASPART 10 UNITS: 100 INJECTION, SOLUTION INTRAVENOUS; SUBCUTANEOUS at 07:02

## 2024-02-22 RX ADMIN — HEPARIN SODIUM 5000 UNITS: 5000 INJECTION INTRAVENOUS; SUBCUTANEOUS at 02:02

## 2024-02-22 NOTE — SUBJECTIVE & OBJECTIVE
ROS complete and negative unless stated in the interval HPI     Objective:     Vital Signs (Most Recent):  Temp: 97.7 °F (36.5 °C) (02/22/24 0701)  Pulse: 95 (02/22/24 0926)  Resp: 19 (02/22/24 0926)  BP: 137/77 (02/22/24 1000)  SpO2: 96 % (02/22/24 0926) Vital Signs (24h Range):  Temp:  [97.7 °F (36.5 °C)-98.4 °F (36.9 °C)] 97.7 °F (36.5 °C)  Pulse:  [] 95  Resp:  [13-28] 19  SpO2:  [90 %-98 %] 96 %  BP: ()/() 137/77     Weight: 128.8 kg (283 lb 15.2 oz)  Body mass index is 38.51 kg/m².      Intake/Output Summary (Last 24 hours) at 2/22/2024 1025  Last data filed at 2/22/2024 0927  Gross per 24 hour   Intake 2560.75 ml   Output 700 ml   Net 1860.75 ml        Physical Exam  Vitals reviewed.   Constitutional:       General: He is awake. He is not in acute distress.     Appearance: He is obese. He is not ill-appearing or toxic-appearing.   Cardiovascular:      Rate and Rhythm: Tachycardia present.      Pulses:           Radial pulses are 2+ on the right side and 2+ on the left side.   Pulmonary:      Effort: Pulmonary effort is normal.      Breath sounds: Normal breath sounds.      Comments: On RA  Abdominal:      General: There is no distension.      Palpations: Abdomen is soft.      Comments: Obese abd   Musculoskeletal:      Right lower leg: No edema.      Left lower leg: No edema.      Comments: LANZA   Skin:     General: Skin is warm and dry.   Neurological:      General: No focal deficit present.      Mental Status: He is alert.   Psychiatric:         Behavior: Behavior is cooperative.                  Lines/Drains/Airways       Peripheral Intravenous Line  Duration                  Peripheral IV - Single Lumen 02/21/24 0130 18 G Anterior;Right Forearm 1 day         Peripheral IV - Single Lumen 02/21/24 0140 18 G Left Forearm 1 day                    Significant Labs:    CBC/Anemia Profile:  Recent Labs   Lab 02/21/24  0142 02/21/24  0441   WBC 10.88 10.37   HGB 14.7 13.7*   HCT 45.6 39.4*   PLT  274 228   MCV 90 84   RDW 13.8 13.2        Chemistries:  Recent Labs   Lab 02/21/24  0142 02/21/24  0441 02/21/24  0709 02/21/24  1354 02/21/24  1615 02/21/24 2035 02/22/24  0330   * 126*   < > 135* 135* 134* 134*   K 5.1 4.0   < > 3.9 3.6 4.1 4.2   CL 83* 96   < > 106 105 107 104   CO2 13* 14*   < > 17* 19* 18* 17*   BUN 34* 30*   < > 24* 23* 21* 22*   CREATININE 2.8* 2.4*   < > 1.5* 1.5* 1.6* 1.7*   CALCIUM 9.4 8.8   < > 8.9 8.8 8.9 8.8   ALBUMIN 3.7  --   --   --   --   --   --    PROT 8.2  --   --   --   --   --   --    BILITOT 0.8  --   --   --   --   --   --    ALKPHOS 185*  --   --   --   --   --   --    ALT 39  --   --   --   --   --   --    AST 30  --   --   --   --   --   --    MG  --  2.4  --   --   --   --  2.1   PHOS  --  2.5*   < > 2.9 2.5*  --  2.3*    < > = values in this interval not displayed.       All pertinent labs within the past 24 hours have been reviewed.    Significant Imaging:  I have reviewed all pertinent imaging results/findings within the past 24 hours.

## 2024-02-22 NOTE — ASSESSMENT & PLAN NOTE
- lipid panel reviewed   - needs lifestyle and diet modifications the most  - defer new meds to HM

## 2024-02-22 NOTE — TREATMENT PLAN
Treatment Plan Note- Critical Care    Mr. Ba has improved with treatment and DKA has resolved per labs. He is requesting to eat. SQ insulin, long and short acting ordered as well as diet. BMP x 1 more time to assure ongoing progress. If labs remain stable, patient able to tolerate PO intake and glucose trends reasonable over the next few hours, will be stable for step-down. Updates given to oncoming night team.     Jerod Flynn NP   Critical Care/ Pulmonary Medicine

## 2024-02-22 NOTE — CONSULTS
Diabetes Education  Author: Karolina Titus RN  Date: 2/22/2024      Patient awake and alert sitting up in bed. Patient states he was diagnosed with pre diabetes and placed on metformin but he was not taking it as ordered. Educated patient on the importance of taking medication as ordered. Discussed diabetic diet, alternative drink and food options, hypo/hyperglycemia signs/symptoms/treatment, insulin storage, action, types, injection sites, A1C levels, importance of follow up visits, long term complications of diabetes, outpatient educational classes, DKA, and glucose monitoring. Patient was able to return demonstration correctly for insulin administration and glucometer use. Patient states he fills comfortable performing tasks at home. Educational handouts also given with all information covered. Patients self goals are to eat a diabetic diet, take medications as ordered and check blood sugar levels. Sample glucometer and supplies given but patient will need prescription for glucometer and supplies when discharged.                   Health Maintenance was reviewed today with patient. Discussed with patient importance of routine eye exams, foot exams/foot care, blood work (i.e.: A1c, microalbumin, and lipid), dental visits, yearly flu vaccine, and pneumonia vaccine as indicated by PCP. Patient verbalized understanding.     Health Maintenance Topics with due status: Not Due       Topic Last Completion Date    TETANUS VACCINE 10/05/2018    Colorectal Cancer Screening 08/16/2023    Lipid Panel 02/20/2024    Hemoglobin A1c 02/20/2024     Health Maintenance Due   Topic Date Due    Diabetes Urine Screening  Never done    Foot Exam  Never done    Eye Exam  Never done    Low Dose Statin  Never done    LDCT Lung Screen  08/18/2021    Influenza Vaccine (1) Never done               The patient received a copy of today's self-management plan and verbalized understanding of the care plan, goals, and all of today's  instructions.      The patient was encouraged to communicate with his physician and care team regarding his condition(s) and treatment.  I provided the patient with my contact information today and encouraged him to contact me via phone or patient portal as needed.

## 2024-02-22 NOTE — PROGRESS NOTES
[facility] ([facility])  Critical Care Medicine  Progress Note    Patient Name: Kvng Ba Jr.  MRN: [id number]  Admission Date: 2/21/2024  Hospital Length of Stay: 1 days  Code Status: Full Code  Attending Provider: Adri Bradshaw MD  Primary Care Provider: Blanco Love MD   Principal Problem: Diabetic ketoacidosis without coma associated with type 2 diabetes mellitus    Subjective:     HPI:  Kvng Ba is a 53-year-old male with a past medical history of HTN, HLD, obesity, pre-diabetes that presented to the ER 2/21 after being sent per his PCP for Na 117, glucose of nearly 1000, malaise and thirst. Pt was found to be in DKA. He was given IVF and IV insulin. Pt was admitted to the ICU for DKA mgmt and was able to be transitioned to SQ insulin on that afternoon. Pt has done well overnight. SQ long acting insulin increased this AM given glucose readings in the 300s. Pt stable for transfer out of the ICU to the floor 2/22 under the care of [facility].    Hospital/ICU Course:  2/22: had DM education this AM, feels well, tolerating PO, stable for transfer out of the ICU to the floor, insulin dosing adjusted the AM for better glycemic control     ROS complete and negative unless stated in the interval HPI     Objective:     Vital Signs (Most Recent):  Temp: 97.7 °F (36.5 °C) (02/22/24 0701)  Pulse: 95 (02/22/24 0926)  Resp: 19 (02/22/24 0926)  BP: 137/77 (02/22/24 1000)  SpO2: 96 % (02/22/24 0926) Vital Signs (24h Range):  Temp:  [97.7 °F (36.5 °C)-98.4 °F (36.9 °C)] 97.7 °F (36.5 °C)  Pulse:  [] 95  Resp:  [13-28] 19  SpO2:  [90 %-98 %] 96 %  BP: ()/() 137/77     Weight: 128.8 kg (283 lb 15.2 oz)  Body mass index is 38.51 kg/m².      Intake/Output Summary (Last 24 hours) at 2/22/2024 1025  Last data filed at 2/22/2024 0927  Gross per 24 hour   Intake 2560.75 ml   Output 700 ml   Net 1860.75 ml        Physical Exam  Vitals reviewed.   Constitutional:       General: He is  awake. He is not in acute distress.     Appearance: He is obese. He is not ill-appearing or toxic-appearing.   Cardiovascular:      Rate and Rhythm: Tachycardia present.      Pulses:           Radial pulses are 2+ on the right side and 2+ on the left side.   Pulmonary:      Effort: Pulmonary effort is normal.      Breath sounds: Normal breath sounds.      Comments: On RA  Abdominal:      General: There is no distension.      Palpations: Abdomen is soft.      Comments: Obese abd   Musculoskeletal:      Right lower leg: No edema.      Left lower leg: No edema.      Comments: LANZA   Skin:     General: Skin is warm and dry.   Neurological:      General: No focal deficit present.      Mental Status: He is alert.   Psychiatric:         Behavior: Behavior is cooperative.                  Lines/Drains/Airways       Peripheral Intravenous Line  Duration                  Peripheral IV - Single Lumen 02/21/24 0130 18 G Anterior;Right Forearm 1 day         Peripheral IV - Single Lumen 02/21/24 0140 18 G Left Forearm 1 day                    Significant Labs:    CBC/Anemia Profile:  Recent Labs   Lab 02/21/24  0142 02/21/24  0441   WBC 10.88 10.37   HGB 14.7 13.7*   HCT 45.6 39.4*    228   MCV 90 84   RDW 13.8 13.2        Chemistries:  Recent Labs   Lab 02/21/24  0142 02/21/24  0441 02/21/24  0709 02/21/24  1354 02/21/24  1615 02/21/24  2035 02/22/24  0330   * 126*   < > 135* 135* 134* 134*   K 5.1 4.0   < > 3.9 3.6 4.1 4.2   CL 83* 96   < > 106 105 107 104   CO2 13* 14*   < > 17* 19* 18* 17*   BUN 34* 30*   < > 24* 23* 21* 22*   CREATININE 2.8* 2.4*   < > 1.5* 1.5* 1.6* 1.7*   CALCIUM 9.4 8.8   < > 8.9 8.8 8.9 8.8   ALBUMIN 3.7  --   --   --   --   --   --    PROT 8.2  --   --   --   --   --   --    BILITOT 0.8  --   --   --   --   --   --    ALKPHOS 185*  --   --   --   --   --   --    ALT 39  --   --   --   --   --   --    AST 30  --   --   --   --   --   --    MG  --  2.4  --   --   --   --  2.1   PHOS  --  2.5*    < > 2.9 2.5*  --  2.3*    < > = values in this interval not displayed.       All pertinent labs within the past 24 hours have been reviewed.    Significant Imaging:  I have reviewed all pertinent imaging results/findings within the past 24 hours.    ABG  Recent Labs   Lab 02/21/24  0154   PH 7.308*   PO2 37*   PCO2 42.2   HCO3 21.1*   BE -5*     Assessment/Plan:     Pulmonary  Asthma with COPD  - not in acute exac  - PRN nebs    Cardiac/Vascular  Mixed hyperlipidemia  - lipid panel reviewed   - needs lifestyle and diet modifications the most  - defer new meds to       Primary hypertension  - resume home propanolol  - monitor trends and adjust as needed for control     Renal/  BENIGNO (acute kidney injury)  - crt 1.3 in 2023  - ? some component of CKD  - improved from admit  - monitor trends and UOP    Endocrine  * Diabetic ketoacidosis without coma associated with type 2 diabetes mellitus  - responded well to DKA protocol  - on SQ insulin and titration for better glucose control   - new diabetic, reports he was prediabetic a few months ago and was started on metformon  - A1C 11.1  - needs close follow up with PCP     Metabolic syndrome  - see plan for DKA    Type 2 diabetes mellitus  - see plan for DKA    Severe obesity (BMI 35.0-39.9) with comorbidity  - affecting medical decision making and complicating the above   - pt would benefit greatly from weight loss and lifestyle modifications     GI  Acute pancreatitis  - repeat lipase pending  - tolerating PO, no abd pain or N/V    Other  RC (obstructive sleep apnea)  - frequently non-compliant with home CPAP  - compliance stressed   - pt would benefit greatly from weight loss and lifestyle modifications     Prophylaxis Measures:  GI ppx: Oral Diet  VTE ppx: Heparin  Glucose control: Insulin subcutaneous    Code Status: Full Code    Patient stable for care outside of the ICU setting. [facility] consulted. Critical Care team will sign off at this time as patient's critical  care issues have resolved and patient is appropriate for ongoing management outside of the ICU setting. Please call if the patient's condition should change and warrant reevaluation.        Bennie Terry NP  Critical Care Medicine  [facility] - Intensive Care ([facility])

## 2024-02-22 NOTE — ASSESSMENT & PLAN NOTE
- responded well to DKA protocol  - on SQ insulin and titration for better glucose control   - new diabetic, reports he was prediabetic a few months ago and was started on metformon  - A1C 11.1  - needs close follow up with PCP

## 2024-02-22 NOTE — HOSPITAL COURSE
2/22: had DM education this AM, feels well, tolerating PO, stable for transfer out of the ICU to the floor, insulin dosing adjusted the AM for better glycemic control

## 2024-02-22 NOTE — ASSESSMENT & PLAN NOTE
- frequently non-compliant with home CPAP  - compliance stressed   - pt would benefit greatly from weight loss and lifestyle modifications

## 2024-02-22 NOTE — PLAN OF CARE
Problem: Fluid and Electrolyte Imbalance (Acute Kidney Injury/Impairment)  Goal: Fluid and Electrolyte Balance  Outcome: Ongoing, Progressing  Intervention: Monitor and Manage Fluid and Electrolyte Balance  Flowsheets (Taken 2/22/2024 0422)  Fluid/Electrolyte Management:   fluids provided   oral rehydration therapy initiated   electrolyte supplement adjusted     Problem: Renal Function Impairment (Acute Kidney Injury/Impairment)  Goal: Effective Renal Function  Outcome: Ongoing, Progressing  Intervention: Monitor and Support Renal Function  Flowsheets (Taken 2/22/2024 0422)  Stabilization Measures: fluid resuscitation initiated  Medication Review/Management: medications reviewed     Problem: Diabetes Comorbidity  Goal: Blood Glucose Level Within Targeted Range  Outcome: Ongoing, Not Progressing  Intervention: Monitor and Manage Glycemia  Flowsheets (Taken 2/22/2024 0422)  Glycemic Management:   blood glucose monitored   supplemental insulin given

## 2024-02-22 NOTE — ASSESSMENT & PLAN NOTE
- affecting medical decision making and complicating the above   - pt would benefit greatly from weight loss and lifestyle modifications

## 2024-02-22 NOTE — PLAN OF CARE
[facility] - Intensive Care (Hospital)  Initial Discharge Assessment       Primary Care Provider: Blanco Love MD    Admission Diagnosis: Hyperglycemia [R73.9]  Diabetic ketoacidosis without coma associated with type 2 diabetes mellitus [E11.10]    Admission Date: 2/21/2024  Expected Discharge Date:     Transition of Care Barriers: None    Payor: BLUE CROSS BLUE SHIELD / Plan: BCBS ALL OUT OF STATE / Product Type: PPO /     Extended Emergency Contact Information  Primary Emergency Contact: Christiane Ba   [place]  Mobile Phone: [phone]  Relation: Sister    Discharge Plan A: Home with family         [id number] - [place], [place] - [address]  [address] [facility] [address]  Phone: [phone] Fax: [phone]      Initial Assessment (most recent)       Adult Discharge Assessment - 02/22/24 1313          Discharge Assessment    Assessment Type Discharge Planning Assessment     Confirmed/corrected address, phone number and insurance Yes     Confirmed Demographics Correct on Facesheet     Source of Information patient     Communicated REGULO with patient/caregiver Date not available/Unable to determine     Reason For Admission Diabetic ketoacidosis without coma associated with type 2 diabetes mellitus     People in Home sibling(s)     Facility Arrived From: home     Do you expect to return to your current living situation? Yes     Do you have help at home or someone to help you manage your care at home? Yes     Who are your caregiver(s) and their phone number(s)? Christiane gibbons     Prior to hospitilization cognitive status: Alert/Oriented     Current cognitive status: Alert/Oriented     Walking or Climbing Stairs Difficulty no     Dressing/Bathing Difficulty no     Home Accessibility wheelchair accessible     Home Layout Able to live on 1st floor     Equipment Currently Used at Home CPAP     Readmission within 30 days? No     Patient currently being followed by outpatient  case management? No     Do you currently have service(s) that help you manage your care at home? No     Do you take prescription medications? Yes     Do you have prescription coverage? Yes     Do you have any problems affording any of your prescribed medications? No     Is the patient taking medications as prescribed? yes     Who is going to help you get home at discharge? Christiane calderon     How do you get to doctors appointments? car, drives self     Are you on dialysis? No     Do you take coumadin? No     Discharge Plan A Home with family     DME Needed Upon Discharge  none     Discharge Plan discussed with: Patient     Transition of Care Barriers None                   Anticipated DC dispo: home with family   Prior Level of Function: independent with ADLs   People in home: Christiane calderon     Comments:  CM met with patient at bedside to introduce role and discuss discharge planning. Christiane Calderon, will be help at home and can provide transport at time of discharge. Confirmed demographics, insurance, and emergency contacts. CM discharge needs depends on hospital progress. CM will continue following to assist with other needs.

## 2024-02-23 LAB
ANION GAP SERPL CALC-SCNC: 13 MMOL/L (ref 8–16)
BASOPHILS # BLD AUTO: 0.05 K/UL (ref 0–0.2)
BASOPHILS NFR BLD: 0.6 % (ref 0–1.9)
BUN SERPL-MCNC: 18 MG/DL (ref 6–20)
CALCIUM SERPL-MCNC: 8.5 MG/DL (ref 8.7–10.5)
CHLORIDE SERPL-SCNC: 105 MMOL/L (ref 95–110)
CO2 SERPL-SCNC: 17 MMOL/L (ref 23–29)
CREAT SERPL-MCNC: 1.4 MG/DL (ref 0.5–1.4)
DIFFERENTIAL METHOD BLD: ABNORMAL
EOSINOPHIL # BLD AUTO: 0.3 K/UL (ref 0–0.5)
EOSINOPHIL NFR BLD: 3.5 % (ref 0–8)
ERYTHROCYTE [DISTWIDTH] IN BLOOD BY AUTOMATED COUNT: 13.1 % (ref 11.5–14.5)
EST. GFR  (NO RACE VARIABLE): >60 ML/MIN/1.73 M^2
GLUCOSE SERPL-MCNC: 309 MG/DL (ref 70–110)
HCT VFR BLD AUTO: 37.7 % (ref 40–54)
HGB BLD-MCNC: 12.8 G/DL (ref 14–18)
IMM GRANULOCYTES # BLD AUTO: 0.02 K/UL (ref 0–0.04)
IMM GRANULOCYTES NFR BLD AUTO: 0.3 % (ref 0–0.5)
LYMPHOCYTES # BLD AUTO: 3.3 K/UL (ref 1–4.8)
LYMPHOCYTES NFR BLD: 41 % (ref 18–48)
MAGNESIUM SERPL-MCNC: 2 MG/DL (ref 1.6–2.6)
MCH RBC QN AUTO: 28.8 PG (ref 27–31)
MCHC RBC AUTO-ENTMCNC: 34 G/DL (ref 32–36)
MCV RBC AUTO: 85 FL (ref 82–98)
MONOCYTES # BLD AUTO: 0.5 K/UL (ref 0.3–1)
MONOCYTES NFR BLD: 5.9 % (ref 4–15)
NEUTROPHILS # BLD AUTO: 3.9 K/UL (ref 1.8–7.7)
NEUTROPHILS NFR BLD: 48.7 % (ref 38–73)
NRBC BLD-RTO: 0 /100 WBC
PHOSPHATE SERPL-MCNC: 2.6 MG/DL (ref 2.7–4.5)
PLATELET # BLD AUTO: 195 K/UL (ref 150–450)
PMV BLD AUTO: 10.5 FL (ref 9.2–12.9)
POCT GLUCOSE: 240 MG/DL (ref 70–110)
POCT GLUCOSE: 264 MG/DL (ref 70–110)
POCT GLUCOSE: 299 MG/DL (ref 70–110)
POCT GLUCOSE: 309 MG/DL (ref 70–110)
POTASSIUM SERPL-SCNC: 4 MMOL/L (ref 3.5–5.1)
RBC # BLD AUTO: 4.44 M/UL (ref 4.6–6.2)
SODIUM SERPL-SCNC: 135 MMOL/L (ref 136–145)
WBC # BLD AUTO: 7.93 K/UL (ref 3.9–12.7)

## 2024-02-23 PROCEDURE — 96361 HYDRATE IV INFUSION ADD-ON: CPT

## 2024-02-23 PROCEDURE — 96375 TX/PRO/DX INJ NEW DRUG ADDON: CPT

## 2024-02-23 PROCEDURE — 63600175 PHARM REV CODE 636 W HCPCS: Performed by: NURSE PRACTITIONER

## 2024-02-23 PROCEDURE — 63600175 PHARM REV CODE 636 W HCPCS: Performed by: EMERGENCY MEDICINE

## 2024-02-23 PROCEDURE — 36415 COLL VENOUS BLD VENIPUNCTURE: CPT

## 2024-02-23 PROCEDURE — 25000003 PHARM REV CODE 250: Performed by: EMERGENCY MEDICINE

## 2024-02-23 PROCEDURE — 85025 COMPLETE CBC W/AUTO DIFF WBC: CPT | Performed by: NURSE PRACTITIONER

## 2024-02-23 PROCEDURE — 25000003 PHARM REV CODE 250: Performed by: NURSE PRACTITIONER

## 2024-02-23 PROCEDURE — 11000001 HC ACUTE MED/SURG PRIVATE ROOM

## 2024-02-23 PROCEDURE — 83735 ASSAY OF MAGNESIUM: CPT

## 2024-02-23 PROCEDURE — 84100 ASSAY OF PHOSPHORUS: CPT

## 2024-02-23 PROCEDURE — 63600175 PHARM REV CODE 636 W HCPCS

## 2024-02-23 PROCEDURE — 96372 THER/PROPH/DIAG INJ SC/IM: CPT | Performed by: NURSE PRACTITIONER

## 2024-02-23 PROCEDURE — 80048 BASIC METABOLIC PNL TOTAL CA: CPT

## 2024-02-23 PROCEDURE — 96372 THER/PROPH/DIAG INJ SC/IM: CPT | Performed by: EMERGENCY MEDICINE

## 2024-02-23 RX ORDER — INSULIN ASPART 100 [IU]/ML
10 INJECTION, SOLUTION INTRAVENOUS; SUBCUTANEOUS
Status: DISCONTINUED | OUTPATIENT
Start: 2024-02-23 | End: 2024-02-24 | Stop reason: HOSPADM

## 2024-02-23 RX ORDER — INSULIN ASPART 100 [IU]/ML
0-15 INJECTION, SOLUTION INTRAVENOUS; SUBCUTANEOUS
Status: DISCONTINUED | OUTPATIENT
Start: 2024-02-23 | End: 2024-02-24 | Stop reason: HOSPADM

## 2024-02-23 RX ORDER — INSULIN ASPART 100 [IU]/ML
6 INJECTION, SOLUTION INTRAVENOUS; SUBCUTANEOUS
Status: DISCONTINUED | OUTPATIENT
Start: 2024-02-23 | End: 2024-02-23

## 2024-02-23 RX ADMIN — PROPRANOLOL HYDROCHLORIDE 20 MG: 20 TABLET ORAL at 09:02

## 2024-02-23 RX ADMIN — INSULIN ASPART 9 UNITS: 100 INJECTION, SOLUTION INTRAVENOUS; SUBCUTANEOUS at 04:02

## 2024-02-23 RX ADMIN — INSULIN ASPART 6 UNITS: 100 INJECTION, SOLUTION INTRAVENOUS; SUBCUTANEOUS at 07:02

## 2024-02-23 RX ADMIN — INSULIN DETEMIR 10 UNITS: 100 INJECTION, SOLUTION SUBCUTANEOUS at 02:02

## 2024-02-23 RX ADMIN — INSULIN ASPART 12 UNITS: 100 INJECTION, SOLUTION INTRAVENOUS; SUBCUTANEOUS at 11:02

## 2024-02-23 RX ADMIN — HEPARIN SODIUM 5000 UNITS: 5000 INJECTION INTRAVENOUS; SUBCUTANEOUS at 02:02

## 2024-02-23 RX ADMIN — INSULIN ASPART 10 UNITS: 100 INJECTION, SOLUTION INTRAVENOUS; SUBCUTANEOUS at 04:02

## 2024-02-23 RX ADMIN — PROPRANOLOL HYDROCHLORIDE 20 MG: 20 TABLET ORAL at 08:02

## 2024-02-23 RX ADMIN — MUPIROCIN: 20 OINTMENT TOPICAL at 09:02

## 2024-02-23 RX ADMIN — MORPHINE SULFATE 4 MG: 4 INJECTION INTRAVENOUS at 06:02

## 2024-02-23 RX ADMIN — SODIUM CHLORIDE: 9 INJECTION, SOLUTION INTRAVENOUS at 03:02

## 2024-02-23 RX ADMIN — INSULIN ASPART 4 UNITS: 100 INJECTION, SOLUTION INTRAVENOUS; SUBCUTANEOUS at 09:02

## 2024-02-23 RX ADMIN — MUPIROCIN: 20 OINTMENT TOPICAL at 08:02

## 2024-02-23 RX ADMIN — HEPARIN SODIUM 5000 UNITS: 5000 INJECTION INTRAVENOUS; SUBCUTANEOUS at 09:02

## 2024-02-23 RX ADMIN — INSULIN ASPART 6 UNITS: 100 INJECTION, SOLUTION INTRAVENOUS; SUBCUTANEOUS at 11:02

## 2024-02-23 RX ADMIN — HEPARIN SODIUM 5000 UNITS: 5000 INJECTION INTRAVENOUS; SUBCUTANEOUS at 06:02

## 2024-02-23 NOTE — ASSESSMENT & PLAN NOTE
Body mass index is 38.51 kg/m². Morbid obesity complicates all aspects of disease management from diagnostic modalities to treatment. Weight loss encouraged and health benefits explained to patient.     Must diet and exercise, lose weight

## 2024-02-23 NOTE — PROGRESS NOTES
[facility] - Intensive Care [facility] Medicine  Progress Note    Patient Name: Kvng Ba Jr.  MRN: [id number]  Patient Class: IP- Inpatient   Admission Date: 2/21/2024  Length of Stay: 1 days  Attending Physician: Daija García MD  Primary Care Provider: Blanco Love MD        Subjective:     Principal Problem:Diabetic ketoacidosis without coma associated with type 2 diabetes mellitus        HPI:  Kvng Ba Jr. is a 53 y.o. male patient with a PMHx of HTN, HLP, Pre Diabetes who presented to the ER on 2/21/24 at 1:30 am for evaluation per PCP for abnormal labs (low Na 117, gh glucose 964). The patient reports that he takes Metformin for prediabetes. He is c/o polydipsia, polyuria, generalized abdominal pain, and fatigue. Symptoms are constant and moderate in severity. Patient denies any fever, chills, N/V, CP, SOB, and all other sxs at this time. No prior Tx reported. No further complaints or concerns at this time. Pt was diagnosed with DKA and admitted to ICu and treated as per DKA protocol with IVF and Insulin gtt. Electrolytes replaced. (See ICU note)       Overview/Hospital Course:  53 y.o. male patient with a PMHx of HTN, HLP, Pre Diabetes who presented to the ER on 2/21/24 at 1:30 am for evaluation per PCP for abnormal labs (low Na 117, gh glucose 964). The patient reports that he takes Metformin for prediabetes. He is c/o polydipsia, polyuria, generalized abdominal pain, and fatigue. Symptoms are constant and moderate in severity. Patient denies any fever, chills, N/V, CP, SOB, and all other sxs at this time. No prior Tx reported. No NVD, no polyuria, polydipsia. His last A1c was 6 in July 23 and current A1c is 11.     Pt was diagnosed with DKA and admitted to ICU and treated as per DKA protocol with IVF and Insulin gtt. Electrolytes replaced. Pt felt much better.     2/22- DKA resolved, pt feels lot better, AAOX3, AG closed, Na improved to 131, Bun/Cr 22/1.7, HCO3 17, no  NV, started on oral feedings which he tolerated well. He was able to ambulate in the room and use the BR and was downgraded to floor. His BS were much better but still around 300s, hence Levemir increased to 35 units bID plus regular Insulin 6 units tid w meals plus SSI. Also IVF resumed at 125 cc/hr.      2/23- looks and feels much better, eating drinking well, walking around well, no NV, no PPN. Getting IVF and Bun/Cr normal now. BS still in 300s. So levemir extra dose plus Levemir increased to 45 and Novolog to 10 units Tid plus SSI. Pt has been ambulating well. Diabetic education and training provided. Most likely will need Insulin for a 2 weeks or longer.     Interval History: looks and feels much better, eating drinking well, walking around well, no NV, no PPN. Getting IVF and Bun/Cr normal now. BS still in 300s. So levemir extra dose plus Levemir increased to 45 and Novolog to 10 units Tid plus SSI. Pt has been ambulating well. Diabetic education and training provided. Most likely will need Insulin for a 2 weeks or longer.     Review of Systems   Constitutional:  Positive for activity change. Negative for appetite change, chills, fever and unexpected weight change.   HENT:  Negative for sore throat.    Eyes: Negative.    Respiratory: Negative.     Cardiovascular: Negative.    Gastrointestinal:  Negative for abdominal distention, abdominal pain, constipation, diarrhea, nausea and vomiting.   Endocrine: Negative for polydipsia, polyphagia and polyuria.   Genitourinary: Negative.    Musculoskeletal: Negative.    Skin: Negative.    Neurological: Negative.      Objective:     Vital Signs (Most Recent):  Temp: 97.8 °F (36.6 °C) (02/23/24 1200)  Pulse: 80 (02/23/24 1200)  Resp: 19 (02/23/24 1200)  BP: (!) 156/93 (02/23/24 1200)  SpO2: 96 % (02/23/24 1200) Vital Signs (24h Range):  Temp:  [97.7 °F (36.5 °C)-98.9 °F (37.2 °C)] 97.8 °F (36.6 °C)  Pulse:  [72-89] 80  Resp:  [17-37] 19  SpO2:  [93 %-97 %] 96 %  BP:  (122-166)/(57-93) 156/93     Weight: 128.8 kg (283 lb 15.2 oz)  Body mass index is 38.51 kg/m².    Intake/Output Summary (Last 24 hours) at 2/23/2024 1631  Last data filed at 2/23/2024 1500  Gross per 24 hour   Intake 2444.92 ml   Output --   Net 2444.92 ml         Physical Exam  Vitals and nursing note reviewed.   Constitutional:       General: He is awake. He is not in acute distress.     Appearance: He is obese. He is not ill-appearing or toxic-appearing.   HENT:      Right Ear: External ear normal.      Left Ear: External ear normal.      Nose: Nose normal.      Mouth/Throat:      Mouth: Mucous membranes are moist.      Pharynx: Oropharynx is clear.   Eyes:      Extraocular Movements: Extraocular movements intact.      Conjunctiva/sclera: Conjunctivae normal.      Pupils: Pupils are equal, round, and reactive to light.   Cardiovascular:      Rate and Rhythm: Normal rate and regular rhythm.      Pulses: Normal pulses.           Radial pulses are 2+ on the right side and 2+ on the left side.      Heart sounds: Normal heart sounds. No murmur heard.     No friction rub. No gallop.   Pulmonary:      Effort: Pulmonary effort is normal.      Breath sounds: Normal breath sounds.      Comments: On RA  Abdominal:      General: Bowel sounds are normal. There is no distension.      Palpations: Abdomen is soft.      Comments: Obese abd   Musculoskeletal:         General: Normal range of motion.      Cervical back: Normal range of motion and neck supple.      Right lower leg: No edema.      Left lower leg: No edema.      Comments: LANZA   Skin:     General: Skin is warm and dry.      Capillary Refill: Capillary refill takes 2 to 3 seconds.   Neurological:      General: No focal deficit present.      Mental Status: He is alert.   Psychiatric:         Mood and Affect: Mood normal.         Behavior: Behavior normal. Behavior is cooperative.         Thought Content: Thought content normal.         Judgment: Judgment normal.              Significant Labs: All pertinent labs within the past 24 hours have been reviewed.  BMP:   Recent Labs   Lab 02/23/24  0329   *   *   K 4.0      CO2 17*   BUN 18   CREATININE 1.4   CALCIUM 8.5*   MG 2.0     CBC:   Recent Labs   Lab 02/23/24  0329   WBC 7.93   HGB 12.8*   HCT 37.7*        Magnesium:   Recent Labs   Lab 02/22/24  0330 02/23/24  0329   MG 2.1 2.0       Significant Imaging: I have reviewed all pertinent imaging results/findings within the past 24 hours.    Assessment/Plan:      * Diabetic ketoacidosis without coma associated with type 2 diabetes mellitus  Much better, DKA resolved, BS still high but AG closed  Will give IVF plus Cont Levemir plus SSI    Doing much better        BENIGNO (acute kidney injury)  Patient with acute kidney injury/acute renal failure likely due to pre-renal azotemia due to dehydration BENIGNO is currently improving. Baseline creatinine  0.9  - Labs reviewed- Renal function/electrolytes with Estimated Creatinine Clearance: 84.7 mL/min (based on SCr of 1.4 mg/dL). according to latest data. Monitor urine output and serial BMP and adjust therapy as needed. Avoid nephrotoxins and renally dose meds for GFR listed above.    Improved with IVF, Cr 1.4 now    Acute pancreatitis  Sec to high TG- on Insulin gtt- improved  No overt/clinical Pancreatitis    Lipase coming down, no abd pain      Mixed hyperlipidemia  See pancreatitis- I TG sec to severe Hyperglycemia      Type 2 diabetes mellitus  Patient's FSGs are uncontrolled due to hyperglycemia on current medication regimen.  Last A1c reviewed-   Lab Results   Component Value Date    HGBA1C 11.1 (H) 02/20/2024     Most recent fingerstick glucose reviewed-   Recent Labs   Lab 02/22/24  1704 02/22/24  2105 02/23/24  0733 02/23/24  1111   POCTGLUCOSE 418* 340* 299* 309*       Current correctional scale  High  Increase anti-hyperglycemic dose as follows-   Antihyperglycemics (From admission, onward)      Start     Stop  Route Frequency Ordered    02/23/24 2100  insulin detemir U-100 (Levemir) pen 45 Units         -- SubQ 2 times daily 02/23/24 1436    02/23/24 1645  insulin aspart U-100 pen 10 Units         -- SubQ 3 times daily with meals 02/23/24 1436    02/23/24 0913  insulin aspart U-100 pen 0-15 Units         -- SubQ Before meals & nightly PRN 02/23/24 0814          Hold Oral hypoglycemics while patient is in the hospital.    Cont present Insulin regimen plus SSI and IVF    Severe obesity (BMI 35.0-39.9) with comorbidity  Body mass index is 38.51 kg/m². Morbid obesity complicates all aspects of disease management from diagnostic modalities to treatment. Weight loss encouraged and health benefits explained to patient.     Must diet and exercise, lose weight      Primary hypertension  Chronic, controlled. Latest blood pressure and vitals reviewed-     Temp:  [97.7 °F (36.5 °C)-98.9 °F (37.2 °C)]   Pulse:  [72-89]   Resp:  [17-37]   BP: (122-166)/(57-93)   SpO2:  [93 %-97 %] .   Home meds for hypertension were reviewed and noted below.   Hypertension Medications               olmesartan (BENICAR) 40 MG tablet Take 1 tablet (40 mg total) by mouth once daily.    propranoloL (INDERAL) 20 MG tablet Take 1 tablet (20 mg total) by mouth 2 (two) times daily.            While in the hospital, will manage blood pressure as follows; Continue home antihypertensive regimen    Will utilize p.r.n. blood pressure medication only if patient's blood pressure greater than 160/100 and he develops symptoms such as worsening chest pain or shortness of breath.    Asthma with COPD  asymptomatic      RC (obstructive sleep apnea)  Need Sleep study and CPAP as OP        VTE Risk Mitigation (From admission, onward)           Ordered     heparin (porcine) injection 5,000 Units  Every 8 hours         02/21/24 0310                    Discharge Planning   REGULO:      Code Status: Full Code   Is the patient medically ready for discharge?:     Reason for patient  still in hospital (select all that apply): Patient trending condition, Laboratory test, and Treatment  Discharge Plan A: Home with family            Daija García MD  Department of Hospital Medicine   [facility] - Intensive Care ([facility])

## 2024-02-23 NOTE — ASSESSMENT & PLAN NOTE
Much better, DKA resolved, BS still high but AG closed  Will give IVF plus Cont Levemir plus SSI

## 2024-02-23 NOTE — ASSESSMENT & PLAN NOTE
Patient's FSGs are uncontrolled due to hyperglycemia on current medication regimen.  Last A1c reviewed-   Lab Results   Component Value Date    HGBA1C 11.1 (H) 02/20/2024     Most recent fingerstick glucose reviewed-   Recent Labs   Lab 02/22/24  1704 02/22/24  2105 02/23/24  0733 02/23/24  1111   POCTGLUCOSE 418* 340* 299* 309*       Current correctional scale  High  Increase anti-hyperglycemic dose as follows-   Antihyperglycemics (From admission, onward)    Start     Stop Route Frequency Ordered    02/23/24 2100  insulin detemir U-100 (Levemir) pen 45 Units         -- SubQ 2 times daily 02/23/24 1436    02/23/24 1645  insulin aspart U-100 pen 10 Units         -- SubQ 3 times daily with meals 02/23/24 1436    02/23/24 0913  insulin aspart U-100 pen 0-15 Units         -- SubQ Before meals & nightly PRN 02/23/24 0814        Hold Oral hypoglycemics while patient is in the hospital.    Cont present Insulin regimen plus SSI and IVF

## 2024-02-23 NOTE — SUBJECTIVE & OBJECTIVE
Past Medical History:   Diagnosis Date    ADHD (attention deficit hyperactivity disorder)     Anxiety     Chronic back pain     DJD (degenerative joint disease) of lumbar spine     GERD (gastroesophageal reflux disease)     High cholesterol        Past Surgical History:   Procedure Laterality Date    ARTHROPLASTY      R knee    COSMETIC SURGERY  1985    Nose broken and fixed    FEMUR FRACTURE SURGERY  01/01/2002    jose in place    FRACTURE SURGERY  1992    Broken rt femur    LUMBAR DISCECTOMY  01/01/2000    L4-L5    LUMBAR FUSION      L4-L5    SPINE SURGERY  2010    Spinal fusion L4/L5    TONSILLECTOMY  ?    Dont remeber date       Review of patient's allergies indicates:  No Known Allergies    No current facility-administered medications on file prior to encounter.     Current Outpatient Medications on File Prior to Encounter   Medication Sig    albuterol (PROVENTIL/VENTOLIN HFA) 90 mcg/actuation inhaler Inhale 2 puffs into the lungs every 4 (four) hours as needed for Wheezing or Shortness of Breath.    meloxicam (MOBIC) 15 MG tablet Take 15 mg by mouth.    metFORMIN (GLUCOPHAGE) 500 MG tablet Take 1 tablet (500 mg total) by mouth daily with breakfast.    olmesartan (BENICAR) 40 MG tablet Take 1 tablet (40 mg total) by mouth once daily.    omega-3 acid ethyl esters (LOVAZA) 1 gram capsule Take 2 capsules (2 g total) by mouth 2 (two) times daily.    propranoloL (INDERAL) 20 MG tablet Take 1 tablet (20 mg total) by mouth 2 (two) times daily.    avanafiL (STENDRA) 200 mg Tab Take 200 mg by mouth daily as needed (sexual activity).     Family History       Problem Relation (Age of Onset)    Cancer Father    Heart disease Mother    Hypertension Mother, Father    Leukemia Mother    Prostate cancer Father          Tobacco Use    Smoking status: Former     Current packs/day: 0.00     Average packs/day: 1.1 packs/day for 47.2 years (53.1 ttl pk-yrs)     Types: Cigarettes     Start date: 1/1/1985     Quit date: 5/30/2020      Years since quitting: 3.7    Smokeless tobacco: Never    Tobacco comments:     Quit 5/2020   Substance and Sexual Activity    Alcohol use: Not Currently     Comment: Drink two or three times a year    Drug use: No    Sexual activity: Not Currently     Review of Systems   Constitutional:  Positive for activity change. Negative for appetite change, chills, fever and unexpected weight change.   HENT:  Negative for sore throat.    Eyes: Negative.    Respiratory: Negative.     Cardiovascular: Negative.    Gastrointestinal:  Negative for abdominal distention, abdominal pain, constipation, diarrhea, nausea and vomiting.   Endocrine: Negative for polydipsia, polyphagia and polyuria.   Genitourinary: Negative.    Musculoskeletal: Negative.    Skin: Negative.    Neurological: Negative.      Objective:     Vital Signs (Most Recent):  Temp: 97.8 °F (36.6 °C) (02/23/24 1200)  Pulse: 80 (02/23/24 1200)  Resp: 19 (02/23/24 1200)  BP: (!) 156/93 (02/23/24 1200)  SpO2: 96 % (02/23/24 1200) Vital Signs (24h Range):  Temp:  [97.7 °F (36.5 °C)-98.9 °F (37.2 °C)] 97.8 °F (36.6 °C)  Pulse:  [72-89] 80  Resp:  [17-37] 19  SpO2:  [93 %-97 %] 96 %  BP: (122-166)/(57-93) 156/93     Weight: 128.8 kg (283 lb 15.2 oz)  Body mass index is 38.51 kg/m².     Physical Exam  Vitals reviewed.   Constitutional:       General: He is awake. He is not in acute distress.     Appearance: He is obese. He is not ill-appearing or toxic-appearing.   HENT:      Right Ear: External ear normal.      Left Ear: External ear normal.      Mouth/Throat:      Mouth: Mucous membranes are dry.      Pharynx: Oropharynx is clear.   Eyes:      Extraocular Movements: Extraocular movements intact.      Pupils: Pupils are equal, round, and reactive to light.   Cardiovascular:      Rate and Rhythm: Normal rate and regular rhythm.      Pulses: Normal pulses.           Radial pulses are 2+ on the right side and 2+ on the left side.      Heart sounds: Normal heart sounds. No murmur  heard.     No friction rub. No gallop.   Pulmonary:      Effort: Pulmonary effort is normal.      Breath sounds: Normal breath sounds.      Comments: On RA  Abdominal:      General: Bowel sounds are normal. There is no distension.      Palpations: Abdomen is soft.      Comments: Obese abd   Musculoskeletal:         General: Normal range of motion.      Cervical back: Normal range of motion and neck supple.      Right lower leg: No edema.      Left lower leg: No edema.      Comments: LANZA   Skin:     General: Skin is warm and dry.      Capillary Refill: Capillary refill takes 2 to 3 seconds.   Neurological:      General: No focal deficit present.      Mental Status: He is alert.   Psychiatric:         Mood and Affect: Mood normal.         Behavior: Behavior normal. Behavior is cooperative.         Thought Content: Thought content normal.         Judgment: Judgment normal.          Significant Labs: All pertinent labs within the past 24 hours have been reviewed.  A1C:   Recent Labs   Lab 02/20/24  0808   HGBA1C 11.1*     BMP:   Recent Labs   Lab 02/23/24  0329   *   *   K 4.0      CO2 17*   BUN 18   CREATININE 1.4   CALCIUM 8.5*   MG 2.0     CBC:   Recent Labs   Lab 02/23/24  0329   WBC 7.93   HGB 12.8*   HCT 37.7*        CMP:   Recent Labs   Lab 02/21/24  2035 02/22/24  0330 02/23/24  0329   * 134* 135*   K 4.1 4.2 4.0    104 105   CO2 18* 17* 17*   * 351* 309*   BUN 21* 22* 18   CREATININE 1.6* 1.7* 1.4   CALCIUM 8.9 8.8 8.5*   ANIONGAP 9 13 13       Magnesium:   Recent Labs   Lab 02/22/24  0330 02/23/24  0329   MG 2.1 2.0     POCT Glucose:   Recent Labs   Lab 02/22/24  2105 02/23/24  0733 02/23/24  1111   POCTGLUCOSE 340* 299* 309*       Significant Imaging: I have reviewed all pertinent imaging results/findings within the past 24 hours.

## 2024-02-23 NOTE — SUBJECTIVE & OBJECTIVE
Interval History: looks and feels much better, eating drinking well, walking around well, no NV, no PPN. Getting IVF and Bun/Cr normal now. BS still in 300s. So levemir extra dose plus Levemir increased to 45 and Novolog to 10 units Tid plus SSI. Pt has been ambulating well. Diabetic education and training provided. Most likely will need Insulin for a 2 weeks or longer.     Review of Systems   Constitutional:  Positive for activity change. Negative for appetite change, chills, fever and unexpected weight change.   HENT:  Negative for sore throat.    Eyes: Negative.    Respiratory: Negative.     Cardiovascular: Negative.    Gastrointestinal:  Negative for abdominal distention, abdominal pain, constipation, diarrhea, nausea and vomiting.   Endocrine: Negative for polydipsia, polyphagia and polyuria.   Genitourinary: Negative.    Musculoskeletal: Negative.    Skin: Negative.    Neurological: Negative.      Objective:     Vital Signs (Most Recent):  Temp: 97.8 °F (36.6 °C) (02/23/24 1200)  Pulse: 80 (02/23/24 1200)  Resp: 19 (02/23/24 1200)  BP: (!) 156/93 (02/23/24 1200)  SpO2: 96 % (02/23/24 1200) Vital Signs (24h Range):  Temp:  [97.7 °F (36.5 °C)-98.9 °F (37.2 °C)] 97.8 °F (36.6 °C)  Pulse:  [72-89] 80  Resp:  [17-37] 19  SpO2:  [93 %-97 %] 96 %  BP: (122-166)/(57-93) 156/93     Weight: 128.8 kg (283 lb 15.2 oz)  Body mass index is 38.51 kg/m².    Intake/Output Summary (Last 24 hours) at 2/23/2024 1631  Last data filed at 2/23/2024 1500  Gross per 24 hour   Intake 2444.92 ml   Output --   Net 2444.92 ml         Physical Exam  Vitals and nursing note reviewed.   Constitutional:       General: He is awake. He is not in acute distress.     Appearance: He is obese. He is not ill-appearing or toxic-appearing.   HENT:      Right Ear: External ear normal.      Left Ear: External ear normal.      Nose: Nose normal.      Mouth/Throat:      Mouth: Mucous membranes are moist.      Pharynx: Oropharynx is clear.   Eyes:       Extraocular Movements: Extraocular movements intact.      Conjunctiva/sclera: Conjunctivae normal.      Pupils: Pupils are equal, round, and reactive to light.   Cardiovascular:      Rate and Rhythm: Normal rate and regular rhythm.      Pulses: Normal pulses.           Radial pulses are 2+ on the right side and 2+ on the left side.      Heart sounds: Normal heart sounds. No murmur heard.     No friction rub. No gallop.   Pulmonary:      Effort: Pulmonary effort is normal.      Breath sounds: Normal breath sounds.      Comments: On RA  Abdominal:      General: Bowel sounds are normal. There is no distension.      Palpations: Abdomen is soft.      Comments: Obese abd   Musculoskeletal:         General: Normal range of motion.      Cervical back: Normal range of motion and neck supple.      Right lower leg: No edema.      Left lower leg: No edema.      Comments: LANZA   Skin:     General: Skin is warm and dry.      Capillary Refill: Capillary refill takes 2 to 3 seconds.   Neurological:      General: No focal deficit present.      Mental Status: He is alert.   Psychiatric:         Mood and Affect: Mood normal.         Behavior: Behavior normal. Behavior is cooperative.         Thought Content: Thought content normal.         Judgment: Judgment normal.             Significant Labs: All pertinent labs within the past 24 hours have been reviewed.  BMP:   Recent Labs   Lab 02/23/24  0329   *   *   K 4.0      CO2 17*   BUN 18   CREATININE 1.4   CALCIUM 8.5*   MG 2.0     CBC:   Recent Labs   Lab 02/23/24  0329   WBC 7.93   HGB 12.8*   HCT 37.7*        Magnesium:   Recent Labs   Lab 02/22/24  0330 02/23/24  0329   MG 2.1 2.0       Significant Imaging: I have reviewed all pertinent imaging results/findings within the past 24 hours.

## 2024-02-23 NOTE — HPI
Kvng Ba Jr. is a 53 y.o. male patient with a PMHx of HTN, HLP, Pre Diabetes who presented to the ER on 2/21/24 at 1:30 am for evaluation per PCP for abnormal labs (low Na 117, gh glucose 964). The patient reports that he takes Metformin for prediabetes. He is c/o polydipsia, polyuria, generalized abdominal pain, and fatigue. Symptoms are constant and moderate in severity. Patient denies any fever, chills, N/V, CP, SOB, and all other sxs at this time. No prior Tx reported. No further complaints or concerns at this time. Pt was diagnosed with DKA and admitted to ICu and treated as per DKA protocol with IVF and Insulin gtt. Electrolytes replaced. (See ICU note)

## 2024-02-23 NOTE — ASSESSMENT & PLAN NOTE
Sec to high TG- on Insulin gtt- improved  No overt/clinical Pancreatitis    Lipase coming down, no abd pain

## 2024-02-23 NOTE — PLAN OF CARE
"POC reviewed with patient. Questions answered, concerns addressed. Patient verbalized an understanding but states, "This is a lot." Support provided with every encounter from explaining needle placement on insulin pen, medication administration in subcutaneus sites, long and short acting insulin difference, usage of the insulin pen and, most importantly, s/s of hypoglycemia vs hyperglycemia. Continued reinforcement needed from patient's perspective.      RESP:   Remains on room air.  Saturations remained adequate, no significant desats noted.     NEURO:   Remains at neuro baseline and afebrile.   PRN morphine administered for generalized discomfort 7/10; insulin 4 units.    CV:   Remains hemodynamically stable.   NS at 75ml/hr as ordered. PIV x2 patent, drsg CDI.    GI/:   Continues to tolerate feeds; pm snack provided.  Voiding adequately via toilet, No BM noted.     See flowsheets and eMAR for details. POC reviewed with.... Questions answered, verbalized understanding, support provided.  "

## 2024-02-23 NOTE — HOSPITAL COURSE
53 y.o. male patient with a PMHx of HTN, HLP, Pre Diabetes who presented to the ER on 2/21/24 at 1:30 am for evaluation per PCP for abnormal labs (low Na 117, gh glucose 964). The patient reports that he takes Metformin for prediabetes. He is c/o polydipsia, polyuria, generalized abdominal pain, and fatigue. Symptoms are constant and moderate in severity. Patient denies any fever, chills, N/V, CP, SOB, and all other sxs at this time. No prior Tx reported. No NVD, no polyuria, polydipsia. His last A1c was 6 in July 23 and current A1c is 11.     Pt was diagnosed with DKA and admitted to ICU and treated as per DKA protocol with IVF and Insulin gtt. Electrolytes replaced. Pt felt much better.     2/22- DKA resolved, pt feels lot better, AAOX3, AG closed, Na improved to 131, Bun/Cr 22/1.7, HCO3 17, no NV, started on oral feedings which he tolerated well. He was able to ambulate in the room and use the BR and was downgraded to floor. His BS were much better but still around 300s, hence Levemir increased to 35 units bID plus regular Insulin 6 units tid w meals plus SSI. Also IVF resumed at 125 cc/hr.      2/23- looks and feels much better, eating drinking well, walking around well, no NV, no PPN. Getting IVF and Bun/Cr normal now. BS still in 300s. So levemir extra dose plus Levemir increased to 45 and Novolog to 10 units Tid plus SSI. Pt has been ambulating well. Diabetic education and training provided. Most likely will need Insulin for a 2 weeks or longer.     2/24- Looks and feels much better, stronger, rested well, eating drinking well, no NV, BS much improved with increased Insulin dose. Pt is an  and travels around the state for work and eats lots of junk food. He also quit smoking 3 years ago and has gained about 70-80 lbs since. Pt was counseled in detail about his diet and exercise and to lose weight. He will be on Insulin for another couple of weeks while he starts his new Oral Diabetic Meds (Janumet  100/1000 daily plus Jardiance 10 mg daily). These meds should bring his BS under control along with diet and exercise. He may also Benefit from Ozempic or Mounjaro in near future to help control his DM better plus lose some weight. He will follow with his PCP Dr. Love as well as has been referred to [facility]. He may also benefit from wither Dexcom or FreeStyle Al to monitor his glucose. He understands and accepts. He was seen and examined and deemed stable for discdischarge home today.discharge home today.      His initial TG/Cholesterol were falsely very high due to severe Hyperglycemia- will need repeat lipids in a month. In the meantime, he will start Crestor 40 mg po daily plus a baby ASA.

## 2024-02-23 NOTE — CONSULTS
[facility] - Intensive Care ([facility] Medicine  Consult Note    Patient Name: Kvng Ba Jr.  MRN: [id number]  Admission Date: 2/21/2024  Hospital Length of Stay: 1 days  Attending Physician: Daija García MD   Primary Care Provider: Blanco Love MD           Patient information was obtained from patient, past medical records, ER records, and primary team.     Consults  Subjective:     Principal Problem: Diabetic ketoacidosis without coma associated with type 2 diabetes mellitus    Chief Complaint:   Chief Complaint   Patient presents with    Abnormal Labs     PCP referred him to ED. Na low (117) and Glucose almost 1000 (964). Generalized malaise and fatigue, thirsty.         HPI: Kvng Ba Jr. is a 53 y.o. male patient with a PMHx of HTN, HLP, Pre Diabetes who presented to the ER on 2/21/24 at 1:30 am for evaluation per PCP for abnormal labs (low Na 117, gh glucose 964). The patient reports that he takes Metformin for prediabetes. He is c/o polydipsia, polyuria, generalized abdominal pain, and fatigue. Symptoms are constant and moderate in severity. Patient denies any fever, chills, N/V, CP, SOB, and all other sxs at this time. No prior Tx reported. No further complaints or concerns at this time. Pt was diagnosed with DKA and admitted to ICu and treated as per DKA protocol with IVF and Insulin gtt. Electrolytes replaced. (See ICU note)       Past Medical History:   Diagnosis Date    ADHD (attention deficit hyperactivity disorder)     Anxiety     Chronic back pain     DJD (degenerative joint disease) of lumbar spine     GERD (gastroesophageal reflux disease)     High cholesterol        Past Surgical History:   Procedure Laterality Date    ARTHROPLASTY      R knee    COSMETIC SURGERY  1985    Nose broken and fixed    FEMUR FRACTURE SURGERY  01/01/2002    jose in place    FRACTURE SURGERY  1992    Broken rt femur    LUMBAR DISCECTOMY  01/01/2000    L4-L5    LUMBAR FUSION      L4-L5     SPINE SURGERY  2010    Spinal fusion L4/L5    TONSILLECTOMY  ?    Dont remeber date       Review of patient's allergies indicates:  No Known Allergies    No current facility-administered medications on file prior to encounter.     Current Outpatient Medications on File Prior to Encounter   Medication Sig    albuterol (PROVENTIL/VENTOLIN HFA) 90 mcg/actuation inhaler Inhale 2 puffs into the lungs every 4 (four) hours as needed for Wheezing or Shortness of Breath.    meloxicam (MOBIC) 15 MG tablet Take 15 mg by mouth.    metFORMIN (GLUCOPHAGE) 500 MG tablet Take 1 tablet (500 mg total) by mouth daily with breakfast.    olmesartan (BENICAR) 40 MG tablet Take 1 tablet (40 mg total) by mouth once daily.    omega-3 acid ethyl esters (LOVAZA) 1 gram capsule Take 2 capsules (2 g total) by mouth 2 (two) times daily.    propranoloL (INDERAL) 20 MG tablet Take 1 tablet (20 mg total) by mouth 2 (two) times daily.    avanafiL (STENDRA) 200 mg Tab Take 200 mg by mouth daily as needed (sexual activity).     Family History       Problem Relation (Age of Onset)    Cancer Father    Heart disease Mother    Hypertension Mother, Father    Leukemia Mother    Prostate cancer Father          Tobacco Use    Smoking status: Former     Current packs/day: 0.00     Average packs/day: 1.1 packs/day for 47.2 years (53.1 ttl pk-yrs)     Types: Cigarettes     Start date: 1/1/1985     Quit date: 5/30/2020     Years since quitting: 3.7    Smokeless tobacco: Never    Tobacco comments:     Quit 5/2020   Substance and Sexual Activity    Alcohol use: Not Currently     Comment: Drink two or three times a year    Drug use: No    Sexual activity: Not Currently     Review of Systems   Constitutional:  Positive for activity change. Negative for appetite change, chills, fever and unexpected weight change.   HENT:  Negative for sore throat.    Eyes: Negative.    Respiratory: Negative.     Cardiovascular: Negative.    Gastrointestinal:  Negative for abdominal  distention, abdominal pain, constipation, diarrhea, nausea and vomiting.   Endocrine: Negative for polydipsia, polyphagia and polyuria.   Genitourinary: Negative.    Musculoskeletal: Negative.    Skin: Negative.    Neurological: Negative.      Objective:     Vital Signs (Most Recent):  Temp: 97.8 °F (36.6 °C) (02/23/24 1200)  Pulse: 80 (02/23/24 1200)  Resp: 19 (02/23/24 1200)  BP: (!) 156/93 (02/23/24 1200)  SpO2: 96 % (02/23/24 1200) Vital Signs (24h Range):  Temp:  [97.7 °F (36.5 °C)-98.9 °F (37.2 °C)] 97.8 °F (36.6 °C)  Pulse:  [72-89] 80  Resp:  [17-37] 19  SpO2:  [93 %-97 %] 96 %  BP: (122-166)/(57-93) 156/93     Weight: 128.8 kg (283 lb 15.2 oz)  Body mass index is 38.51 kg/m².     Physical Exam  Vitals reviewed.   Constitutional:       General: He is awake. He is not in acute distress.     Appearance: He is obese. He is not ill-appearing or toxic-appearing.   HENT:      Right Ear: External ear normal.      Left Ear: External ear normal.      Mouth/Throat:      Mouth: Mucous membranes are dry.      Pharynx: Oropharynx is clear.   Eyes:      Extraocular Movements: Extraocular movements intact.      Pupils: Pupils are equal, round, and reactive to light.   Cardiovascular:      Rate and Rhythm: Normal rate and regular rhythm.      Pulses: Normal pulses.           Radial pulses are 2+ on the right side and 2+ on the left side.      Heart sounds: Normal heart sounds. No murmur heard.     No friction rub. No gallop.   Pulmonary:      Effort: Pulmonary effort is normal.      Breath sounds: Normal breath sounds.      Comments: On RA  Abdominal:      General: Bowel sounds are normal. There is no distension.      Palpations: Abdomen is soft.      Comments: Obese abd   Musculoskeletal:         General: Normal range of motion.      Cervical back: Normal range of motion and neck supple.      Right lower leg: No edema.      Left lower leg: No edema.      Comments: LANZA   Skin:     General: Skin is warm and dry.       Capillary Refill: Capillary refill takes 2 to 3 seconds.   Neurological:      General: No focal deficit present.      Mental Status: He is alert.   Psychiatric:         Mood and Affect: Mood normal.         Behavior: Behavior normal. Behavior is cooperative.         Thought Content: Thought content normal.         Judgment: Judgment normal.          Significant Labs: All pertinent labs within the past 24 hours have been reviewed.  A1C:   Recent Labs   Lab 02/20/24  0808   HGBA1C 11.1*     BMP:   Recent Labs   Lab 02/23/24  0329   *   *   K 4.0      CO2 17*   BUN 18   CREATININE 1.4   CALCIUM 8.5*   MG 2.0     CBC:   Recent Labs   Lab 02/23/24  0329   WBC 7.93   HGB 12.8*   HCT 37.7*        CMP:   Recent Labs   Lab 02/21/24  2035 02/22/24  0330 02/23/24  0329   * 134* 135*   K 4.1 4.2 4.0    104 105   CO2 18* 17* 17*   * 351* 309*   BUN 21* 22* 18   CREATININE 1.6* 1.7* 1.4   CALCIUM 8.9 8.8 8.5*   ANIONGAP 9 13 13       Magnesium:   Recent Labs   Lab 02/22/24  0330 02/23/24  0329   MG 2.1 2.0     POCT Glucose:   Recent Labs   Lab 02/22/24  2105 02/23/24  0733 02/23/24  1111   POCTGLUCOSE 340* 299* 309*       Significant Imaging: I have reviewed all pertinent imaging results/findings within the past 24 hours.  Assessment/Plan:     * Diabetic ketoacidosis without coma associated with type 2 diabetes mellitus  Much better, DKA resolved, BS still high but AG closed  Will give IVF plus Cont Levemir plus SSI        BENIGNO (acute kidney injury)  Patient with acute kidney injury/acute renal failure likely due to pre-renal azotemia due to dehydration BENIGNO is currently improving. Baseline creatinine  0.9  - Labs reviewed- Renal function/electrolytes with Estimated Creatinine Clearance: 84.7 mL/min (based on SCr of 1.4 mg/dL). according to latest data. Monitor urine output and serial BMP and adjust therapy as needed. Avoid nephrotoxins and renally dose meds for GFR listed  above.    Improved with IVF, Cr 1.4 now    Acute pancreatitis  Sec to high TG- on Insulin gtt- improved  No overt/clinical Pancreatitis      Mixed hyperlipidemia  See pancreatitis- I TG sec to severe Hyperglycemia      Type 2 diabetes mellitus  Patient's FSGs are uncontrolled due to hyperglycemia on current medication regimen.  Last A1c reviewed-   Lab Results   Component Value Date    HGBA1C 11.1 (H) 02/20/2024     Most recent fingerstick glucose reviewed-   Recent Labs   Lab 02/22/24  1704 02/22/24  2105 02/23/24  0733 02/23/24  1111   POCTGLUCOSE 418* 340* 299* 309*     Current correctional scale  High  Increase anti-hyperglycemic dose as follows-   Antihyperglycemics (From admission, onward)      Start     Stop Route Frequency Ordered    02/23/24 2100  insulin detemir U-100 (Levemir) pen 45 Units         -- SubQ 2 times daily 02/23/24 1436    02/23/24 1645  insulin aspart U-100 pen 10 Units         -- SubQ 3 times daily with meals 02/23/24 1436    02/23/24 0913  insulin aspart U-100 pen 0-15 Units         -- SubQ Before meals & nightly PRN 02/23/24 0814          Hold Oral hypoglycemics while patient is in the hospital.    Cont present Insulin regimen plus SSI and IVF    Severe obesity (BMI 35.0-39.9) with comorbidity  Body mass index is 38.51 kg/m². Morbid obesity complicates all aspects of disease management from diagnostic modalities to treatment. Weight loss encouraged and health benefits explained to patient.     Must diet and exercise, lose weight      Primary hypertension  Chronic, controlled. Latest blood pressure and vitals reviewed-     Temp:  [97.7 °F (36.5 °C)-98.9 °F (37.2 °C)]   Pulse:  [72-89]   Resp:  [17-37]   BP: (122-166)/(57-93)   SpO2:  [93 %-97 %] .   Home meds for hypertension were reviewed and noted below.   Hypertension Medications               olmesartan (BENICAR) 40 MG tablet Take 1 tablet (40 mg total) by mouth once daily.    propranoloL (INDERAL) 20 MG tablet Take 1 tablet (20 mg  total) by mouth 2 (two) times daily.            While in the hospital, will manage blood pressure as follows; Continue home antihypertensive regimen    Will utilize p.r.n. blood pressure medication only if patient's blood pressure greater than 160/100 and he develops symptoms such as worsening chest pain or shortness of breath.    Asthma with COPD  asymptomatic      RC (obstructive sleep apnea)  Need Sleep study and CPAP as OP        VTE Risk Mitigation (From admission, onward)           Ordered     heparin (porcine) injection 5,000 Units  Every 8 hours         02/21/24 0310                      Thank you for your consult. I will follow-up with patient. Please contact us if you have any additional questions.    Daija García MD  Department of Hospital Medicine   [facility] - Intensive Care ([facility])

## 2024-02-23 NOTE — ASSESSMENT & PLAN NOTE
Much better, DKA resolved, BS still high but AG closed  Will give IVF plus Cont Levemir plus SSI    Doing much better

## 2024-02-23 NOTE — ASSESSMENT & PLAN NOTE
Patient with acute kidney injury/acute renal failure likely due to pre-renal azotemia due to dehydration BENIGNO is currently improving. Baseline creatinine  0.9  - Labs reviewed- Renal function/electrolytes with Estimated Creatinine Clearance: 84.7 mL/min (based on SCr of 1.4 mg/dL). according to latest data. Monitor urine output and serial BMP and adjust therapy as needed. Avoid nephrotoxins and renally dose meds for GFR listed above.    Improved with IVF, Cr 1.4 now

## 2024-02-23 NOTE — ASSESSMENT & PLAN NOTE
Chronic, controlled. Latest blood pressure and vitals reviewed-     Temp:  [97.7 °F (36.5 °C)-98.9 °F (37.2 °C)]   Pulse:  [72-89]   Resp:  [17-37]   BP: (122-166)/(57-93)   SpO2:  [93 %-97 %] .   Home meds for hypertension were reviewed and noted below.   Hypertension Medications               olmesartan (BENICAR) 40 MG tablet Take 1 tablet (40 mg total) by mouth once daily.    propranoloL (INDERAL) 20 MG tablet Take 1 tablet (20 mg total) by mouth 2 (two) times daily.            While in the hospital, will manage blood pressure as follows; Continue home antihypertensive regimen    Will utilize p.r.n. blood pressure medication only if patient's blood pressure greater than 160/100 and he develops symptoms such as worsening chest pain or shortness of breath.

## 2024-02-24 VITALS
SYSTOLIC BLOOD PRESSURE: 128 MMHG | HEART RATE: 69 BPM | BODY MASS INDEX: 38.46 KG/M2 | HEIGHT: 72 IN | DIASTOLIC BLOOD PRESSURE: 70 MMHG | RESPIRATION RATE: 18 BRPM | TEMPERATURE: 98 F | OXYGEN SATURATION: 95 % | WEIGHT: 283.94 LBS

## 2024-02-24 PROBLEM — K85.90 ACUTE PANCREATITIS: Status: RESOLVED | Noted: 2024-02-21 | Resolved: 2024-02-24

## 2024-02-24 PROBLEM — E11.10 DIABETIC KETOACIDOSIS WITHOUT COMA ASSOCIATED WITH TYPE 2 DIABETES MELLITUS: Status: RESOLVED | Noted: 2024-02-21 | Resolved: 2024-02-24

## 2024-02-24 PROBLEM — N17.9 AKI (ACUTE KIDNEY INJURY): Status: RESOLVED | Noted: 2024-02-21 | Resolved: 2024-02-24

## 2024-02-24 LAB
ANION GAP SERPL CALC-SCNC: 8 MMOL/L (ref 8–16)
BASOPHILS # BLD AUTO: 0.06 K/UL (ref 0–0.2)
BASOPHILS NFR BLD: 0.7 % (ref 0–1.9)
BUN SERPL-MCNC: 15 MG/DL (ref 6–20)
CALCIUM SERPL-MCNC: 8.7 MG/DL (ref 8.7–10.5)
CHLORIDE SERPL-SCNC: 105 MMOL/L (ref 95–110)
CO2 SERPL-SCNC: 22 MMOL/L (ref 23–29)
CREAT SERPL-MCNC: 1.3 MG/DL (ref 0.5–1.4)
DIFFERENTIAL METHOD BLD: ABNORMAL
EOSINOPHIL # BLD AUTO: 0.3 K/UL (ref 0–0.5)
EOSINOPHIL NFR BLD: 3.8 % (ref 0–8)
ERYTHROCYTE [DISTWIDTH] IN BLOOD BY AUTOMATED COUNT: 12.8 % (ref 11.5–14.5)
EST. GFR  (NO RACE VARIABLE): >60 ML/MIN/1.73 M^2
GLUCOSE SERPL-MCNC: 224 MG/DL (ref 70–110)
HCT VFR BLD AUTO: 39.1 % (ref 40–54)
HGB BLD-MCNC: 13.4 G/DL (ref 14–18)
IMM GRANULOCYTES # BLD AUTO: 0.05 K/UL (ref 0–0.04)
IMM GRANULOCYTES NFR BLD AUTO: 0.6 % (ref 0–0.5)
LYMPHOCYTES # BLD AUTO: 3.1 K/UL (ref 1–4.8)
LYMPHOCYTES NFR BLD: 37.2 % (ref 18–48)
MCH RBC QN AUTO: 29 PG (ref 27–31)
MCHC RBC AUTO-ENTMCNC: 34.3 G/DL (ref 32–36)
MCV RBC AUTO: 85 FL (ref 82–98)
MONOCYTES # BLD AUTO: 0.6 K/UL (ref 0.3–1)
MONOCYTES NFR BLD: 6.6 % (ref 4–15)
NEUTROPHILS # BLD AUTO: 4.3 K/UL (ref 1.8–7.7)
NEUTROPHILS NFR BLD: 51.1 % (ref 38–73)
NRBC BLD-RTO: 0 /100 WBC
PLATELET # BLD AUTO: 200 K/UL (ref 150–450)
PMV BLD AUTO: 10.1 FL (ref 9.2–12.9)
POCT GLUCOSE: 259 MG/DL (ref 70–110)
POCT GLUCOSE: 318 MG/DL (ref 70–110)
POTASSIUM SERPL-SCNC: 4 MMOL/L (ref 3.5–5.1)
RBC # BLD AUTO: 4.62 M/UL (ref 4.6–6.2)
SODIUM SERPL-SCNC: 135 MMOL/L (ref 136–145)
WBC # BLD AUTO: 8.43 K/UL (ref 3.9–12.7)

## 2024-02-24 PROCEDURE — 36415 COLL VENOUS BLD VENIPUNCTURE: CPT

## 2024-02-24 PROCEDURE — 25000003 PHARM REV CODE 250: Performed by: NURSE PRACTITIONER

## 2024-02-24 PROCEDURE — 63600175 PHARM REV CODE 636 W HCPCS: Performed by: NURSE PRACTITIONER

## 2024-02-24 PROCEDURE — 80048 BASIC METABOLIC PNL TOTAL CA: CPT

## 2024-02-24 PROCEDURE — 85025 COMPLETE CBC W/AUTO DIFF WBC: CPT | Performed by: NURSE PRACTITIONER

## 2024-02-24 RX ORDER — ASPIRIN 81 MG/1
81 TABLET ORAL DAILY
Qty: 90 TABLET | Refills: 3 | Status: SHIPPED | OUTPATIENT
Start: 2024-02-24 | End: 2025-02-23

## 2024-02-24 RX ORDER — ROSUVASTATIN CALCIUM 40 MG/1
40 TABLET, COATED ORAL NIGHTLY
Qty: 90 TABLET | Refills: 3 | Status: SHIPPED | OUTPATIENT
Start: 2024-02-24 | End: 2025-02-23

## 2024-02-24 RX ORDER — SITAGLIPTIN AND METFORMIN HYDROCHLORIDE 1000; 50 MG/1; MG/1
1 TABLET, FILM COATED, EXTENDED RELEASE ORAL DAILY
Qty: 30 TABLET | Refills: 3 | Status: SHIPPED | OUTPATIENT
Start: 2024-02-24 | End: 2024-02-26

## 2024-02-24 RX ORDER — INSULIN ASPART 100 [IU]/ML
10 INJECTION, SOLUTION INTRAVENOUS; SUBCUTANEOUS
Qty: 9 ML | Refills: 11 | Status: SHIPPED | OUTPATIENT
Start: 2024-02-24 | End: 2025-02-23

## 2024-02-24 RX ADMIN — INSULIN ASPART 10 UNITS: 100 INJECTION, SOLUTION INTRAVENOUS; SUBCUTANEOUS at 12:02

## 2024-02-24 RX ADMIN — ACETAMINOPHEN 650 MG: 325 TABLET ORAL at 04:02

## 2024-02-24 RX ADMIN — MUPIROCIN: 20 OINTMENT TOPICAL at 08:02

## 2024-02-24 RX ADMIN — HEPARIN SODIUM 5000 UNITS: 5000 INJECTION INTRAVENOUS; SUBCUTANEOUS at 05:02

## 2024-02-24 RX ADMIN — INSULIN ASPART 10 UNITS: 100 INJECTION, SOLUTION INTRAVENOUS; SUBCUTANEOUS at 08:02

## 2024-02-24 RX ADMIN — INSULIN ASPART 12 UNITS: 100 INJECTION, SOLUTION INTRAVENOUS; SUBCUTANEOUS at 12:02

## 2024-02-24 RX ADMIN — INSULIN ASPART 9 UNITS: 100 INJECTION, SOLUTION INTRAVENOUS; SUBCUTANEOUS at 08:02

## 2024-02-24 RX ADMIN — PROPRANOLOL HYDROCHLORIDE 20 MG: 20 TABLET ORAL at 08:02

## 2024-02-24 RX ADMIN — ACETAMINOPHEN 650 MG: 325 TABLET ORAL at 10:02

## 2024-02-24 NOTE — DISCHARGE SUMMARY
[facility] Medicine  Discharge Summary      Patient Name: Kvng Ba Jr.  MRN: [id number]  [facility]: [id number]  Patient Class: IP- Inpatient  Admission Date: 2/21/2024  Hospital Length of Stay: 2 days  Discharge Date and Time:  02/24/2024 12:06 PM  Attending Physician: Daija García MD   Discharging Provider: Daija García MD  Primary Care Provider: Blanco Love MD    Primary Care Team: [facility] MEDICINE     HPI:   Kvng Ba Jr. is a 53 y.o. male patient with a PMHx of HTN, HLP, Pre Diabetes who presented to the ER on 2/21/24 at 1:30 am for evaluation per PCP for abnormal labs (low Na 117, gh glucose 964). The patient reports that he takes Metformin for prediabetes. He is c/o polydipsia, polyuria, generalized abdominal pain, and fatigue. Symptoms are constant and moderate in severity. Patient denies any fever, chills, N/V, CP, SOB, and all other sxs at this time. No prior Tx reported. No further complaints or concerns at this time. Pt was diagnosed with DKA and admitted to ICu and treated as per DKA protocol with IVF and Insulin gtt. Electrolytes replaced. (See ICU note)       * No surgery found *      Hospital Course:   53 y.o. male patient with a PMHx of HTN, HLP, Pre Diabetes who presented to the ER on 2/21/24 at 1:30 am for evaluation per PCP for abnormal labs (low Na 117, gh glucose 964). The patient reports that he takes Metformin for prediabetes. He is c/o polydipsia, polyuria, generalized abdominal pain, and fatigue. Symptoms are constant and moderate in severity. Patient denies any fever, chills, N/V, CP, SOB, and all other sxs at this time. No prior Tx reported. No NVD, no polyuria, polydipsia. His last A1c was 6 in July 23 and current A1c is 11.     Pt was diagnosed with DKA and admitted to ICU and treated as per DKA protocol with IVF and Insulin gtt. Electrolytes replaced. Pt felt much better.     2/22- DKA resolved, pt feels lot better, AAOX3, AG closed, Na  improved to 131, Bun/Cr 22/1.7, HCO3 17, no NV, started on oral feedings which he tolerated well. He was able to ambulate in the room and use the BR and was downgraded to floor. His BS were much better but still around 300s, hence Levemir increased to 35 units bID plus regular Insulin 6 units tid w meals plus SSI. Also IVF resumed at 125 cc/hr.      2/23- looks and feels much better, eating drinking well, walking around well, no NV, no PPN. Getting IVF and Bun/Cr normal now. BS still in 300s. So levemir extra dose plus Levemir increased to 45 and Novolog to 10 units Tid plus SSI. Pt has been ambulating well. Diabetic education and training provided. Most likely will need Insulin for a 2 weeks or longer.     2/24- Looks and feels much better, stronger, rested well, eating drinking well, no NV, BS much improved with increased Insulin dose. Pt is an  and travels around the [facility] for work and eats lots of junk food. He also quit smoking 3 years ago and has gained about 70-80 lbs since. Pt was counseled in detail about his diet and exercise and to lose weight. He will be on Insulin for another couple of weeks while he starts his new Oral Diabetic Meds (Janumet 100/1000 daily plus Jardiance 10 mg daily). These meds should bring his BS under control along with diet and exercise. He may also Benefit from Ozempic or Mounjaro in near future to help control his DM better plus lose some weight. He will follow with his PCP Dr. Love as well as has been referred to [facility]. He may also benefit from wither Dexcom or FreeStyle La to monitor his glucose. He understands and accepts. He was seen and examined and deemed stable for discharge home today.       Goals of Care Treatment Preferences:  Code Status: Full Code      Consults:   Consults (From admission, onward)          Status Ordering Provider     Inpatient consult to Hospitalist  Once        Provider:  Daija García MD     Acknowledged GABO RAMOS     Inpatient consult to Diabetes educator  Once        Provider:  (Not yet assigned)    Completed BRITTNEY GATES            No new Assessment & Plan notes have been filed under this hospital service since the last note was generated.  Service: Hospital Medicine    Final Active Diagnoses:    Diagnosis Date Noted POA    Type 2 diabetes mellitus [E11.9] 02/21/2024 Yes     Chronic    Mixed hyperlipidemia [E78.2] 02/21/2024 Yes    Metabolic syndrome [E88.810] 02/21/2024 Yes     Chronic    Severe obesity (BMI 35.0-39.9) with comorbidity [E66.01] 07/27/2023 Yes     Chronic    RC (obstructive sleep apnea) [G47.33] 09/09/2020 Yes     Chronic    Primary hypertension [I10] 09/09/2020 Yes     Chronic    Asthma with COPD [J44.89] 09/09/2020 Yes     Chronic      Problems Resolved During this Admission:    Diagnosis Date Noted Date Resolved POA    PRINCIPAL PROBLEM:  Diabetic ketoacidosis without coma associated with type 2 diabetes mellitus [E11.10] 02/21/2024 02/24/2024 Yes    Acute pancreatitis [K85.90] 02/21/2024 02/24/2024 Yes    BENIGNO (acute kidney injury) [N17.9] 02/21/2024 02/24/2024 Yes       Discharged Condition: stable    Disposition: Home or Self Care    Follow Up:   Follow-up Information       Blanco Love MD. Schedule an appointment as soon as possible for a visit in 3 day(s).    Specialty: Family Medicine  Why: Hospital follow up, As needed  Contact information:  [id number]  [place] [address] [phone]                           Patient Instructions:      Ambulatory referral/consult to Diabetic Advanced Practice Providers (Medical Management)   Standing Status: Future   Referral Priority: Routine Referral Type: Consultation   Referral Reason: Specialty Services Required   Requested Specialty: Endocrinology   Number of Visits Requested: 1     Diet diabetic     Diet Cardiac     Activity as tolerated       Significant Diagnostic Studies: Labs: BMP:   Recent Labs  "  Lab 02/23/24 0329 02/24/24  0333   * 224*   * 135*   K 4.0 4.0    105   CO2 17* 22*   BUN 18 15   CREATININE 1.4 1.3   CALCIUM 8.5* 8.7   MG 2.0  --    , CMP   Recent Labs   Lab 02/23/24 0329 02/24/24  0333   * 135*   K 4.0 4.0    105   CO2 17* 22*   * 224*   BUN 18 15   CREATININE 1.4 1.3   CALCIUM 8.5* 8.7   ANIONGAP 13 8   , CBC   Recent Labs   Lab 02/23/24 0329 02/24/24  0333   WBC 7.93 8.43   HGB 12.8* 13.4*   HCT 37.7* 39.1*    200   , Lipid Panel   Lab Results   Component Value Date    CHOL 334 (H) 02/20/2024    HDL 26 (L) 02/20/2024    LDLCALC Invalid, Trig>400.0 02/20/2024    TRIG 1,778 (H) 02/20/2024    CHOLHDL 7.8 (L) 02/20/2024   , Troponin No results for input(s): "TROPONINI" in the last 168 hours., A1C:   Recent Labs   Lab 02/20/24  0808   HGBA1C 11.1*   , and All labs within the past 24 hours have been reviewed    Pending Diagnostic Studies:       None           Medications:  Reconciled Home Medications:      Medication List        START taking these medications      aspirin 81 MG EC tablet  Commonly known as: ECOTRIN  Take 1 tablet (81 mg total) by mouth once daily.     empagliflozin 10 mg tablet  Commonly known as: JARDIANCE  Take 1 tablet (10 mg total) by mouth once daily.     insulin aspart U-100 100 unit/mL (3 mL) Inpn pen  Commonly known as: NovoLOG  Inject 10 Units into the skin 3 (three) times daily with meals.     insulin detemir U-100 (Levemir) 100 unit/mL (3 mL) Inpn pen  Inject 45 Units into the skin 2 (two) times daily.     JANUMET XR 50-1,000 mg Tm24  Generic drug: SITagliptan-metformin  Take 1 tablet by mouth Daily.     rosuvastatin 40 MG Tab  Commonly known as: CRESTOR  Take 1 tablet (40 mg total) by mouth every evening.            CONTINUE taking these medications      albuterol 90 mcg/actuation inhaler  Commonly known as: PROVENTIL/VENTOLIN HFA  Inhale 2 puffs into the lungs every 4 (four) hours as needed for Wheezing or Shortness of " Breath.     avanafiL 200 mg Tab  Commonly known as: STENDRA  Take 200 mg by mouth daily as needed (sexual activity).     olmesartan 40 MG tablet  Commonly known as: BENICAR  Take 1 tablet (40 mg total) by mouth once daily.     omega-3 acid ethyl esters 1 gram capsule  Commonly known as: LOVAZA  Take 2 capsules (2 g total) by mouth 2 (two) times daily.     propranoloL 20 MG tablet  Commonly known as: INDERAL  Take 1 tablet (20 mg total) by mouth 2 (two) times daily.            STOP taking these medications      meloxicam 15 MG tablet  Commonly known as: MOBIC     metFORMIN 500 MG tablet  Commonly known as: GLUCOPHAGE              Indwelling Lines/Drains at time of discharge:   Lines/Drains/Airways       None                   Time spent on the discharge of patient: 45 minutes         Daija García MD  Department of Hospital Medicine  [facility] - Med Surg

## 2024-02-24 NOTE — PLAN OF CARE
Discussed poc with pt, pt verbalized understanding    Purposeful rounding every 2hours    VS wnl  Cardiac monitoring in use, pt is NSR, tele monitor   Blood glucose monitoring   Fall precautions in place, remains injury free  Pt denies c/o nausea   Pain under control with PRN meds    Accurate I&Os  Bed locked at lowest position  Call light within reach    Chart check complete  Patient is ready for discharge

## 2024-02-24 NOTE — DISCHARGE SUMMARY
[facility] Medicine  Discharge Summary      Patient Name: Kvng Ba Jr.  MRN: [id number]  [facility]: [id number]  Patient Class: IP- Inpatient  Admission Date: 2/21/2024  Hospital Length of Stay: 2 days  Discharge Date and Time:  02/24/2024 12:12 PM  Attending Physician: Daija García MD   Discharging Provider: Daija García MD  Primary Care Provider: Blanco Love MD    Primary Care Team: [facility] MEDICINE     HPI:   Kvng Ba Jr. is a 53 y.o. male patient with a PMHx of HTN, HLP, Pre Diabetes who presented to the ER on 2/21/24 at 1:30 am for evaluation per PCP for abnormal labs (low Na 117, gh glucose 964). The patient reports that he takes Metformin for prediabetes. He is c/o polydipsia, polyuria, generalized abdominal pain, and fatigue. Symptoms are constant and moderate in severity. Patient denies any fever, chills, N/V, CP, SOB, and all other sxs at this time. No prior Tx reported. No further complaints or concerns at this time. Pt was diagnosed with DKA and admitted to ICu and treated as per DKA protocol with IVF and Insulin gtt. Electrolytes replaced. (See ICU note)       * No surgery found *      Hospital Course:   53 y.o. male patient with a PMHx of HTN, HLP, Pre Diabetes who presented to the ER on 2/21/24 at 1:30 am for evaluation per PCP for abnormal labs (low Na 117, gh glucose 964). The patient reports that he takes Metformin for prediabetes. He is c/o polydipsia, polyuria, generalized abdominal pain, and fatigue. Symptoms are constant and moderate in severity. Patient denies any fever, chills, N/V, CP, SOB, and all other sxs at this time. No prior Tx reported. No NVD, no polyuria, polydipsia. His last A1c was 6 in July 23 and current A1c is 11.     Pt was diagnosed with DKA and admitted to ICU and treated as per DKA protocol with IVF and Insulin gtt. Electrolytes replaced. Pt felt much better.     2/22- DKA resolved, pt feels lot better, AAOX3, AG closed, Na  improved to 131, Bun/Cr 22/1.7, HCO3 17, no NV, started on oral feedings which he tolerated well. He was able to ambulate in the room and use the BR and was downgraded to floor. His BS were much better but still around 300s, hence Levemir increased to 35 units bID plus regular Insulin 6 units tid w meals plus SSI. Also IVF resumed at 125 cc/hr.      2/23- looks and feels much better, eating drinking well, walking around well, no NV, no PPN. Getting IVF and Bun/Cr normal now. BS still in 300s. So levemir extra dose plus Levemir increased to 45 and Novolog to 10 units Tid plus SSI. Pt has been ambulating well. Diabetic education and training provided. Most likely will need Insulin for a 2 weeks or longer.     2/24- Looks and feels much better, stronger, rested well, eating drinking well, no NV, BS much improved with increased Insulin dose. Pt is an  and travels around the [employer] for work and eats lots of junk food. He also quit smoking 3 years ago and has gained about 70-80 lbs since. Pt was counseled in detail about his diet and exercise and to lose weight. He will be on Insulin for another couple of weeks while he starts his new Oral Diabetic Meds (Janumet 100/1000 daily plus Jardiance 10 mg daily). These meds should bring his BS under control along with diet and exercise. He may also Benefit from Ozempic or Mounjaro in near future to help control his DM better plus lose some weight. He will follow with his PCP Dr. Love as well as has been referred [facility]. He may also benefit from wither Dexcom or FreeStyle Al to monitor his glucose. He understands and accepts. He was seen and examined and deemed stable for discdischarge home today.discharge home today.      His initial TG/Cholesterol were falsely very high due to severe Hyperglycemia- will need repeat lipids in a month. In the meantime, he will start Crestor 40 mg po daily plus a baby ASA.      Goals of Care  Treatment Preferences:  Code Status: Full Code      Consults:   Consults (From admission, onward)          Status Ordering Provider     Inpatient consult to Hospitalist  Once        Provider:  Daija García MD Acknowledged BOURQUE, ANDREA B.     Inpatient consult to Diabetes educator  Once        Provider:  (Not yet assigned)    BRITTNEY Fontana            No new Assessment & Plan notes have been filed under this hospital service since the last note was generated.  Service: Hospital Medicine    Final Active Diagnoses:    Diagnosis Date Noted POA    Type 2 diabetes mellitus [E11.9] 02/21/2024 Yes     Chronic    Mixed hyperlipidemia [E78.2] 02/21/2024 Yes    Metabolic syndrome [E88.810] 02/21/2024 Yes     Chronic    Severe obesity (BMI 35.0-39.9) with comorbidity [E66.01] 07/27/2023 Yes     Chronic    RC (obstructive sleep apnea) [G47.33] 09/09/2020 Yes     Chronic    Primary hypertension [I10] 09/09/2020 Yes     Chronic    Asthma with COPD [J44.89] 09/09/2020 Yes     Chronic      Problems Resolved During this Admission:    Diagnosis Date Noted Date Resolved POA    PRINCIPAL PROBLEM:  Diabetic ketoacidosis without coma associated with type 2 diabetes mellitus [E11.10] 02/21/2024 02/24/2024 Yes    Acute pancreatitis [K85.90] 02/21/2024 02/24/2024 Yes    BENIGNO (acute kidney injury) [N17.9] 02/21/2024 02/24/2024 Yes       Discharged Condition: stable    Disposition: Home or Self Care    Follow Up:   Follow-up Information       Blanco Love MD. Schedule an appointment as soon as possible for a visit in 3 day(s).    Specialty: Family Medicine  Why: Hospital follow up, As needed  Contact information:  [address]  [place] [address] [phone]                           Patient Instructions:      Ambulatory referral/consult to Diabetic Advanced Practice Providers (Medical Management)   Standing Status: Future   Referral Priority: Routine Referral Type: Consultation   Referral Reason:  "Specialty Services Required   Requested Specialty: Endocrinology   Number of Visits Requested: 1     Diet diabetic     Diet Cardiac     Activity as tolerated       Significant Diagnostic Studies: Labs: BMP:   Recent Labs   Lab 02/23/24 0329 02/24/24  0333   * 224*   * 135*   K 4.0 4.0    105   CO2 17* 22*   BUN 18 15   CREATININE 1.4 1.3   CALCIUM 8.5* 8.7   MG 2.0  --    , CMP   Recent Labs   Lab 02/23/24 0329 02/24/24  0333   * 135*   K 4.0 4.0    105   CO2 17* 22*   * 224*   BUN 18 15   CREATININE 1.4 1.3   CALCIUM 8.5* 8.7   ANIONGAP 13 8   , CBC   Recent Labs   Lab 02/23/24 0329 02/24/24 0333   WBC 7.93 8.43   HGB 12.8* 13.4*   HCT 37.7* 39.1*    200   , Lipid Panel   Lab Results   Component Value Date    CHOL 334 (H) 02/20/2024    HDL 26 (L) 02/20/2024    LDLCALC Invalid, Trig>400.0 02/20/2024    TRIG 1,778 (H) 02/20/2024    CHOLHDL 7.8 (L) 02/20/2024   , Troponin No results for input(s): "TROPONINI" in the last 168 hours., A1C:   Recent Labs   Lab 02/20/24  0808   HGBA1C 11.1*   , and All labs within the past 24 hours have been reviewed    Pending Diagnostic Studies:       None           Medications:  Reconciled Home Medications:      Medication List        START taking these medications      aspirin 81 MG EC tablet  Commonly known as: ECOTRIN  Take 1 tablet (81 mg total) by mouth once daily.     empagliflozin 10 mg tablet  Commonly known as: JARDIANCE  Take 1 tablet (10 mg total) by mouth once daily.     insulin aspart U-100 100 unit/mL (3 mL) Inpn pen  Commonly known as: NovoLOG  Inject 10 Units into the skin 3 (three) times daily with meals.     insulin detemir U-100 (Levemir) 100 unit/mL (3 mL) Inpn pen  Inject 45 Units into the skin 2 (two) times daily.     JANUMET XR 50-1,000 mg Tm24  Generic drug: SITagliptan-metformin  Take 1 tablet by mouth Daily.     rosuvastatin 40 MG Tab  Commonly known as: CRESTOR  Take 1 tablet (40 mg total) by mouth every " evening.            CONTINUE taking these medications      albuterol 90 mcg/actuation inhaler  Commonly known as: PROVENTIL/VENTOLIN HFA  Inhale 2 puffs into the lungs every 4 (four) hours as needed for Wheezing or Shortness of Breath.     avanafiL 200 mg Tab  Commonly known as: STENDRA  Take 200 mg by mouth daily as needed (sexual activity).     olmesartan 40 MG tablet  Commonly known as: BENICAR  Take 1 tablet (40 mg total) by mouth once daily.     omega-3 acid ethyl esters 1 gram capsule  Commonly known as: LOVAZA  Take 2 capsules (2 g total) by mouth 2 (two) times daily.     propranoloL 20 MG tablet  Commonly known as: INDERAL  Take 1 tablet (20 mg total) by mouth 2 (two) times daily.            STOP taking these medications      meloxicam 15 MG tablet  Commonly known as: MOBIC     metFORMIN 500 MG tablet  Commonly known as: GLUCOPHAGE              Indwelling Lines/Drains at time of discharge:   Lines/Drains/Airways       None                   Time spent on the discharge of patient: 45 minutes         Daija García MD  Department of Hospital Medicine  '[facility]

## 2024-02-24 NOTE — PLAN OF CARE
[facility] - Med Surg  Discharge Final Note    Primary Care Provider: Blanco Love MD    Expected Discharge Date: 2/24/2024    Final Discharge Note (most recent)       Final Note - 02/24/24 1233          Final Note    Assessment Type Final Discharge Note     Anticipated Discharge Disposition Home or Self Care        Post-Acute Status    Discharge Delays None known at this time                     Important Message from Medicare             Contact Info       Blanco Love MD   Specialty: Family Medicine   Relationship: PCP - General    [address]  [place] [address]   Phone: [phone]       Next Steps: Schedule an appointment as soon as possible for a visit in 3 day(s)    Instructions: Hospital follow up, As needed          Discharge home, no home health or dme orders noted.

## 2024-02-24 NOTE — PLAN OF CARE
Hemodynamically stable.  Tolerated room air.  Due prn Tylenol given for heache give, see flow sheet.  No further acute complaints.

## 2024-02-26 ENCOUNTER — OFFICE VISIT (OUTPATIENT)
Dept: INTERNAL MEDICINE | Facility: CLINIC | Age: 54
End: 2024-02-26
Payer: COMMERCIAL

## 2024-02-26 ENCOUNTER — TELEPHONE (OUTPATIENT)
Dept: DIABETES | Facility: CLINIC | Age: 54
End: 2024-02-26
Payer: COMMERCIAL

## 2024-02-26 VITALS
WEIGHT: 282.63 LBS | OXYGEN SATURATION: 97 % | HEIGHT: 72 IN | BODY MASS INDEX: 38.28 KG/M2 | SYSTOLIC BLOOD PRESSURE: 138 MMHG | DIASTOLIC BLOOD PRESSURE: 84 MMHG | HEART RATE: 115 BPM

## 2024-02-26 DIAGNOSIS — Z09 HOSPITAL DISCHARGE FOLLOW-UP: Primary | ICD-10-CM

## 2024-02-26 DIAGNOSIS — J02.9 REFLUX PHARYNGITIS: ICD-10-CM

## 2024-02-26 DIAGNOSIS — E11.10 DIABETIC KETOACIDOSIS WITHOUT COMA ASSOCIATED WITH TYPE 2 DIABETES MELLITUS: ICD-10-CM

## 2024-02-26 DIAGNOSIS — E78.2 MIXED HYPERLIPIDEMIA: ICD-10-CM

## 2024-02-26 DIAGNOSIS — Z79.4 TYPE 2 DIABETES MELLITUS WITH HYPERGLYCEMIA, WITH LONG-TERM CURRENT USE OF INSULIN: ICD-10-CM

## 2024-02-26 DIAGNOSIS — I10 PRIMARY HYPERTENSION: Chronic | ICD-10-CM

## 2024-02-26 DIAGNOSIS — E11.65 TYPE 2 DIABETES MELLITUS WITH HYPERGLYCEMIA, WITH LONG-TERM CURRENT USE OF INSULIN: ICD-10-CM

## 2024-02-26 PROCEDURE — 99999 PR PBB SHADOW E&M-EST. PATIENT-LVL III: CPT | Mod: PBBFAC,,, | Performed by: FAMILY MEDICINE

## 2024-02-26 PROCEDURE — 1159F MED LIST DOCD IN RCRD: CPT | Mod: CPTII,S$GLB,, | Performed by: FAMILY MEDICINE

## 2024-02-26 PROCEDURE — 3079F DIAST BP 80-89 MM HG: CPT | Mod: CPTII,S$GLB,, | Performed by: FAMILY MEDICINE

## 2024-02-26 PROCEDURE — 3046F HEMOGLOBIN A1C LEVEL >9.0%: CPT | Mod: CPTII,S$GLB,, | Performed by: FAMILY MEDICINE

## 2024-02-26 PROCEDURE — 1111F DSCHRG MED/CURRENT MED MERGE: CPT | Mod: CPTII,S$GLB,, | Performed by: FAMILY MEDICINE

## 2024-02-26 PROCEDURE — 3075F SYST BP GE 130 - 139MM HG: CPT | Mod: CPTII,S$GLB,, | Performed by: FAMILY MEDICINE

## 2024-02-26 PROCEDURE — 1160F RVW MEDS BY RX/DR IN RCRD: CPT | Mod: CPTII,S$GLB,, | Performed by: FAMILY MEDICINE

## 2024-02-26 PROCEDURE — 99496 TRANSJ CARE MGMT HIGH F2F 7D: CPT | Mod: S$GLB,,, | Performed by: FAMILY MEDICINE

## 2024-02-26 PROCEDURE — 4010F ACE/ARB THERAPY RXD/TAKEN: CPT | Mod: CPTII,S$GLB,, | Performed by: FAMILY MEDICINE

## 2024-02-26 RX ORDER — METFORMIN HYDROCHLORIDE 1000 MG/1
1000 TABLET ORAL 2 TIMES DAILY WITH MEALS
Qty: 60 TABLET | Refills: 11 | Status: SHIPPED | OUTPATIENT
Start: 2024-02-26 | End: 2025-02-25

## 2024-02-26 RX ORDER — OMEPRAZOLE 40 MG/1
40 CAPSULE, DELAYED RELEASE ORAL DAILY
Qty: 30 CAPSULE | Refills: 11 | Status: SHIPPED | OUTPATIENT
Start: 2024-02-26 | End: 2025-02-25

## 2024-02-26 NOTE — PROGRESS NOTES
Subjective:   Patient ID: Kvng Ba Jr. is a 53 y.o. male.  Chief Complaint:  Transitional Care    Presents for hospital discharge follow-up/transitional care visit   Admitted [facility] 2/21/2024 through 2/24/2024   Admitted for DKA  Labs done at admission with glucose 900s, sodium 117, increased BUN and creatinine, mildly increased lipase, and positive serum and urinary ketones  Treated with IV fluids and insulin drip  Converted to combined oral and insulin regimen   Lab abnormalities resolved/improved except for persistent hyperglycemia   A1c was 11%   Discharged on Levemir 45 units twice a day, NovoLog 10 units 3 times a day, Janumet 100/1000 mg twice a day, and Jardiance 10 mg daily   Unfortunately, reports due to cost of medication, did not  any oral medications in only currently on insulin   A.m. fasting glucose is remain 300  Has not developed any recurrent symptoms for DKA    Previously only had known prediabetes and recently started metformin  Microalbumin ordered but not performed in the hospital  Formerly [facility], later [facility] for hypertriglyceridemia, but no statin.  However, Crestor 40 mg started at discharge based on diabetes diagnosis.  Patient has not started medication yet.    No previous diabetic eye exam   Was told might be able to obtain coverage for Ozempic since now diabetic    Previous diagnosis for hypertension, prescribed Benicar 40 mg daily, currently not taking   For increased anxiety type symptoms prior to his diagnosis of DKA, was prescribed Inderal 20 mg twice a day, but never started medication  Overall feels significantly better today than visit day of admit    Review of Systems   Constitutional:  Positive for fatigue.   HENT:  Positive for sore throat and trouble swallowing.    Eyes:  Negative for visual disturbance.   Respiratory:  Negative for apnea, cough, choking, chest tightness, shortness of breath, wheezing and stridor.    Cardiovascular:  Negative for  palpitations.   Gastrointestinal:  Negative for abdominal distention, abdominal pain and nausea.   Endocrine: Positive for polydipsia, polyphagia and polyuria.   Genitourinary:  Positive for frequency.   Neurological:  Negative for tremors.     Objective:   /84 (BP Location: Right arm, Patient Position: Sitting, BP Method: Large (Manual))   Pulse (!) 115   Ht 6' (1.829 m)   Wt 128.2 kg (282 lb 10.1 oz)   SpO2 97%   BMI 38.33 kg/m²     Physical Exam  Vitals and nursing note reviewed.   Constitutional:       Appearance: Normal appearance. He is well-developed. He is obese.   Cardiovascular:      Rate and Rhythm: Regular rhythm. Tachycardia present.   Pulmonary:      Effort: Pulmonary effort is normal.   Musculoskeletal:      Right lower leg: No edema.      Left lower leg: No edema.   Skin:     Findings: No rash.   Psychiatric:         Attention and Perception: Attention normal. He is attentive.         Mood and Affect: Affect normal. Mood is anxious.         Speech: Speech normal.         Behavior: Behavior normal. Behavior is cooperative.         Thought Content: Thought content normal. Thought content is not paranoid or delusional.       Assessment:       ICD-10-CM ICD-9-CM   1. Hospital discharge follow-up  Z09 V67.59   2. Diabetic ketoacidosis without coma associated with type 2 diabetes mellitus  E11.10 250.12   3. Type 2 diabetes mellitus with hyperglycemia, with long-term current use of insulin  E11.65 250.00    Z79.4 790.29     V58.67   4. Primary hypertension  I10 401.9   5. Mixed hyperlipidemia  E78.2 272.2   6. Reflux pharyngitis  J02.9 462     Plan:   Hospital discharge follow-up  Diabetic ketoacidosis without coma associated with type 2 diabetes mellitus  Clinically and hemodynamically stable   No significant decompensation since hospital visit, despite noncompliance with advised medical regimen    Type 2 diabetes mellitus with hyperglycemia, with long-term current use of insulin  -     insulin  detemir U-100, Levemir, 100 unit/mL (3 mL) SubQ InPn pen; Inject 50 Units into the skin 2 (two) times daily.  Dispense: 30 mL; Refill: 11  -     metFORMIN (GLUCOPHAGE) 1000 MG tablet; Take 1 tablet (1,000 mg total) by mouth 2 (two) times daily with meals.  Dispense: 60 tablet; Refill: 11  -     SITagliptin phosphate (JANUVIA) 100 MG Tab; Take 1 tablet (100 mg total) by mouth once daily.  Dispense: 30 tablet; Refill: 11  -     Ambulatory referral/consult to Ophthalmology; Future; Expected date: 03/04/2024  Uncontrolled.    Increase Levemir 50 units twice a day   Check sugars fasting in AM   If reading is greater than 200, increase your Levemir dose that night by 2 units  If you get to 60 units BID, stay at that dose until seen again  Continue NovoLog 10 units 3 times a day   Start metformin 1000 mg daily  Start Januvia 100 mg daily   No Ozempic due to cost and concern for recent pancreatitis  Eye exam ordered  Microalbumin to be formed at next visit  If unable to obtain medications due to cost, will need to notify office immediately, to prevent recurrent DKA and readmission    Primary hypertension  Controlled.  Stable.  Asymptomatic.  BP at goal.    Continue Benicar 40 mg daily   Okay remain off beta-blocker     Mixed hyperlipidemia  Continue aspirin 81 mg daily  Continue Crestor 40 mg daily   Continue fish oil supplementation 2 tablets twice a day  CMP and lipid panel 3 months     Reflux pharyngitis  -     omeprazole (PRILOSEC) 40 MG capsule; Take 1 capsule (40 mg total) by mouth once daily.  Dispense: 30 capsule; Refill: 11    Transitional Care Note    Family and/or Caretaker present at visit?  Yes.  Diagnostic tests reviewed/disposition: I have reviewed all completed as well as pending diagnostic tests at the time of discharge.  Disease/illness education:  Diabetes  Home health/community services discussion/referrals: Patient does not have home health established from hospital visit.  They do not need home health.   If needed, we will set up home health for the patient.   Establishment or re-establishment of referral orders for community resources:  Needs to schedule initial visit with diabetes management.   Discussion with other health care providers: No discussion with other health care providers necessary.     40+ minutes of total time spent on the encounter, which includes face to face time and non-face to face time preparing to see the patient (eg, review of tests), Obtaining and/or reviewing separately obtained history, documenting clinical information in the electronic or other health record, independently interpreting results (not separately reported) and communicating results to the patient/family/caregiver, or Care coordination (not separately reported).

## 2024-02-27 ENCOUNTER — TELEPHONE (OUTPATIENT)
Dept: INTERNAL MEDICINE | Facility: CLINIC | Age: 54
End: 2024-02-27
Payer: COMMERCIAL

## 2024-02-27 ENCOUNTER — TELEPHONE (OUTPATIENT)
Dept: DIABETES | Facility: CLINIC | Age: 54
End: 2024-02-27
Payer: COMMERCIAL

## 2024-02-27 NOTE — TELEPHONE ENCOUNTER
----- Message from Kathi Bobby sent at 2/27/2024  7:59 AM CST -----  Contact: self  Pt is asking for an return call in reference to questions he has about medication,please call back at.[phone] Thx CJ

## 2024-02-27 NOTE — TELEPHONE ENCOUNTER
He can use all of those interchangeably and keep the dose the same   50 units twice a day  Increase 2 units daily if a.m. glucose greater than 200

## 2024-02-27 NOTE — TELEPHONE ENCOUNTER
Spoke with patient to assist with scheduling a new patient appointment with diabetes management Left a message

## 2024-02-27 NOTE — TELEPHONE ENCOUNTER
Spoke with patient and he stated that his brother-in-law was on Lantus and Tresiba before and had some extra pens that he gave to patient. Patient wants to know if these can be used first before he starts the Levimir that Dr. Love prescribed. He said that the Lantus he has is 100 units per ml and the Tresiba pen is 200 units per ml.

## 2024-02-27 NOTE — TELEPHONE ENCOUNTER
Spoke with pt; MA explained to pt dosage of medication he should be taking per PCP notes; pt verbalized understanding /LD

## 2024-03-05 DIAGNOSIS — I10 PRIMARY HYPERTENSION: Chronic | ICD-10-CM

## 2024-03-05 DIAGNOSIS — F40.10 SOCIAL ANXIETY DISORDER: ICD-10-CM

## 2024-03-05 RX ORDER — PROPRANOLOL HYDROCHLORIDE 20 MG/1
20 TABLET ORAL 2 TIMES DAILY
Qty: 60 TABLET | Refills: 0 | OUTPATIENT
Start: 2024-03-05

## 2024-03-05 NOTE — TELEPHONE ENCOUNTER
No care due was identified.  [facility] Embedded Care Due Messages. Reference number: [id number].   3/05/2024 3:19:32 PM CST

## 2024-03-06 NOTE — TELEPHONE ENCOUNTER
Refill Decision Note   Kvng Ba  is requesting a refill authorization.  Brief Assessment and Rationale for Refill:  Quick Discontinue     Medication Therapy Plan: Chaitanya'susan (2/26/24)      Comments:     Note composed:7:55 PM 03/05/2024

## 2024-04-03 ENCOUNTER — PATIENT MESSAGE (OUTPATIENT)
Dept: PULMONOLOGY | Facility: CLINIC | Age: 54
End: 2024-04-03
Payer: COMMERCIAL

## 2024-04-09 DIAGNOSIS — F40.10 SOCIAL ANXIETY DISORDER: ICD-10-CM

## 2024-04-09 DIAGNOSIS — I10 PRIMARY HYPERTENSION: Chronic | ICD-10-CM

## 2024-04-09 NOTE — TELEPHONE ENCOUNTER
No care due was identified.  [facility] Embedded Care Due Messages. Reference number: [id number].   4/09/2024 12:11:29 PM CDT

## 2024-04-10 RX ORDER — PROPRANOLOL HYDROCHLORIDE 20 MG/1
20 TABLET ORAL 2 TIMES DAILY
Qty: 60 TABLET | Refills: 0 | OUTPATIENT
Start: 2024-04-10

## 2024-04-10 NOTE — TELEPHONE ENCOUNTER
Refill Decision Note   Kvng Ba  is requesting a refill authorization.  Brief Assessment and Rationale for Refill:  Quick Discontinue     Medication Therapy Plan: Medication DC'd on 2/26/24      Comments:     Note composed:12:23 PM 04/10/2024

## 2024-06-05 DIAGNOSIS — E11.9 TYPE 2 DIABETES MELLITUS WITHOUT COMPLICATION: ICD-10-CM

## 2024-06-28 ENCOUNTER — TELEPHONE (OUTPATIENT)
Dept: INTERNAL MEDICINE | Facility: CLINIC | Age: 54
End: 2024-06-28
Payer: COMMERCIAL

## 2024-06-28 NOTE — TELEPHONE ENCOUNTER
----- Message from Rita Houser sent at 6/28/2024 11:59 AM CDT -----  .Type:  Needs Medical Advice    Who Called: pt    Would the patient rather a call back or a response via [facility]? Call back  Best Call Back Number: [phone]  Additional Information:     Pt stated he would like a call back regarding his medication

## 2024-06-28 NOTE — TELEPHONE ENCOUNTER
Spoke with pt; Pt stated Dr Garza had him on Meloxicam but he's no longer under his care and wanted PCP to take over refilling medication /LD

## 2024-07-01 ENCOUNTER — TELEPHONE (OUTPATIENT)
Dept: INTERNAL MEDICINE | Facility: CLINIC | Age: 54
End: 2024-07-01
Payer: COMMERCIAL

## 2024-07-02 ENCOUNTER — TELEPHONE (OUTPATIENT)
Dept: DIABETES | Facility: CLINIC | Age: 54
End: 2024-07-02
Payer: COMMERCIAL

## 2024-07-02 NOTE — TELEPHONE ENCOUNTER
Called patient to assist with getting scheduled with diabetes management for new patient appointment, dorothea

## 2024-08-13 ENCOUNTER — TELEPHONE (OUTPATIENT)
Dept: INTERNAL MEDICINE | Facility: CLINIC | Age: 54
End: 2024-08-13
Payer: COMMERCIAL

## 2024-08-13 DIAGNOSIS — I10 PRIMARY HYPERTENSION: Chronic | ICD-10-CM

## 2024-08-13 DIAGNOSIS — F40.10 SOCIAL ANXIETY DISORDER: ICD-10-CM

## 2024-08-13 DIAGNOSIS — Z87.898 HISTORY OF MOTION SICKNESS: Primary | ICD-10-CM

## 2024-08-13 RX ORDER — SCOLOPAMINE TRANSDERMAL SYSTEM 1 MG/1
1 PATCH, EXTENDED RELEASE TRANSDERMAL
Qty: 10 PATCH | Refills: 0 | Status: SHIPPED | OUTPATIENT
Start: 2024-08-13

## 2024-08-13 RX ORDER — PROPRANOLOL HYDROCHLORIDE 20 MG/1
20 TABLET ORAL 2 TIMES DAILY
Qty: 60 TABLET | Refills: 0 | OUTPATIENT
Start: 2024-08-13

## 2024-08-13 NOTE — TELEPHONE ENCOUNTER
Refill Decision Note  Eugene VALERIO. Inappropriate Request     Kvng Ba  is requesting a refill authorization.  Brief Assessment and Rationale for Refill:  Quick Discontinue     Medication Therapy Plan:  prescription was discontinued on 2/26/2024 by Blanco Love    Medication Reconciliation Completed: No   Comments:      Provider Staff:  Action required for this patient    Requires labs      Please see care gap opportunities below in Care Due Message.    Thanks!  [facility]     Appointments      Date Provider   Last Visit   2/26/2024 Blanco Love MD   Next Visit   Visit date not found Blanco Love MD          Note composed:4:40 PM 08/13/2024

## 2024-08-13 NOTE — TELEPHONE ENCOUNTER
Care Due:                  Date            Visit Type   Department     Provider  --------------------------------------------------------------------------------                                EP -                              PRIMARY      [facility] INTER[facility]  Last Visit: 02-      CARE ([facility])   MEDICINE       Blanco Loev  Next Visit: None Scheduled  None         None Found                                                            Last  Test          Frequency    Reason                     Performed    Due Date  --------------------------------------------------------------------------------    HBA1C.......  6 months...  SITagliptin, insulin,      02- 08-                             metFORMIN................    [employer] Embedded Care Due Messages. Reference number: [id number].   8/13/2024 11:47:30 AM CDT

## 2024-08-13 NOTE — TELEPHONE ENCOUNTER
----- Message from Rita Matutejono sent at 8/13/2024  7:50 AM CDT -----  Contact: patient @  [phone]  1MEDICALADVICE     Patient is calling for Medical Advice regarding:Patient is leaving for a trip. Patient need a sea sick Rx . Patient need it by Thursday     How long has patient had these symptoms:    Pharmacy name and phone#:  [id number] [place] - [facility] [place]note 2[place] [facility] [address]  Phone: [phone] Fax: [phone]        Patient wants a call back or thru [facility]:call     Comments:    Please advise patient replies from provider may take up to 48 hours.

## 2024-08-18 ENCOUNTER — PATIENT MESSAGE (OUTPATIENT)
Dept: ADMINISTRATIVE | Facility: HOSPITAL | Age: 54
End: 2024-08-18
Payer: COMMERCIAL

## 2024-11-17 ENCOUNTER — PATIENT MESSAGE (OUTPATIENT)
Dept: ADMINISTRATIVE | Facility: HOSPITAL | Age: 54
End: 2024-11-17
Payer: COMMERCIAL

## 2025-02-16 ENCOUNTER — PATIENT MESSAGE (OUTPATIENT)
Dept: ADMINISTRATIVE | Facility: HOSPITAL | Age: 55
End: 2025-02-16
Payer: COMMERCIAL

## 2025-02-26 DIAGNOSIS — E11.9 TYPE 2 DIABETES MELLITUS WITHOUT COMPLICATION: ICD-10-CM

## 2025-02-26 DIAGNOSIS — E11.9 TYPE 2 DIABETES MELLITUS: ICD-10-CM

## 2025-03-06 ENCOUNTER — PATIENT MESSAGE (OUTPATIENT)
Dept: ADMINISTRATIVE | Facility: HOSPITAL | Age: 55
End: 2025-03-06
Payer: COMMERCIAL

## 2025-04-03 ENCOUNTER — PATIENT OUTREACH (OUTPATIENT)
Dept: ADMINISTRATIVE | Facility: HOSPITAL | Age: 55
End: 2025-04-03
Payer: COMMERCIAL

## 2025-04-15 DIAGNOSIS — I10 PRIMARY HYPERTENSION: Chronic | ICD-10-CM

## 2025-04-15 RX ORDER — OLMESARTAN MEDOXOMIL 40 MG/1
40 TABLET ORAL
Qty: 90 TABLET | Refills: 3 | Status: SHIPPED | OUTPATIENT
Start: 2025-04-15

## 2025-04-15 NOTE — TELEPHONE ENCOUNTER
Refill Routing Note   Medication(s) are not appropriate for processing by [facility] for the following reason(s):        Required labs outdated  Required vitals outdated    [facility] action(s):  Defer     Requires labs : Yes             Appointments  past 12m or future 3m with PCP    Date Provider   Last Visit   2/26/2024 Blanco Love MD   Next Visit   6/16/2025 Blanco Love MD   ED visits in past 90 days: 0        Note composed:6:10 AM 04/15/2025

## 2025-04-15 NOTE — TELEPHONE ENCOUNTER
Care Due:                  Date            Visit Type   Department     Provider  --------------------------------------------------------------------------------                                EP -                              PRIMARY      [facility] INTER[facility]  Last Visit: 02-      CARE ([facility])   AIDEE Love                              EP -                              PRIMARY      [facility] INTER[facility]  Next Visit: 06-      [facility] ([facility])   MEDICINE       Blanco Love                                                            Last  Test          Frequency    Reason                     Performed    Due Date  --------------------------------------------------------------------------------    CBC.........  12 months..  meloxicam................  02- 02-    CMP.........  12 months..  SITagliptin, insulin,      02-   02-                             meloxicam, metFORMIN,                             olmesartan, omega-3......    HBA1C.......  6 months...  SITagliptin, insulin,      02- 08-                             metFORMIN................    Lipid Panel.  12 months..  omega-3..................  02-   02-    [facility] Embedded Care Due Messages. Reference number: [id number].   4/15/2025 12:18:03 AM CDT

## 2025-05-21 DIAGNOSIS — E11.9 TYPE 2 DIABETES MELLITUS WITHOUT COMPLICATION: ICD-10-CM

## 2025-07-01 ENCOUNTER — PATIENT OUTREACH (OUTPATIENT)
Dept: ADMINISTRATIVE | Facility: HOSPITAL | Age: 55
End: 2025-07-01
Payer: COMMERCIAL

## 2025-07-16 ENCOUNTER — TELEPHONE (OUTPATIENT)
Dept: INTERNAL MEDICINE | Facility: CLINIC | Age: 55
End: 2025-07-16
Payer: COMMERCIAL

## 2025-07-16 NOTE — TELEPHONE ENCOUNTER
Copied from CRM #[id number]. Topic: General Inquiry - Patient Advice  >> Jul 16, 2025  1:36 PM Catalina wrote:  .Type:  Patient Requesting Call    Who Called:Kvng Ba Jr.  Does the patient know what this is regarding?:Patient requesting a call back in regards to being out of town and needing some medication refilled  Would the patient rather a call back or a response via [facility]? Call back  Best Call Back Number:.[phone] ([place])   Additional Information:

## 2025-07-17 ENCOUNTER — TELEPHONE (OUTPATIENT)
Dept: INTERNAL MEDICINE | Facility: CLINIC | Age: 55
End: 2025-07-17
Payer: COMMERCIAL

## 2025-07-17 ENCOUNTER — PATIENT MESSAGE (OUTPATIENT)
Dept: ADMINISTRATIVE | Facility: HOSPITAL | Age: 55
End: 2025-07-17
Payer: COMMERCIAL

## 2025-07-17 ENCOUNTER — PATIENT OUTREACH (OUTPATIENT)
Dept: ADMINISTRATIVE | Facility: HOSPITAL | Age: 55
End: 2025-07-17
Payer: COMMERCIAL

## 2025-07-17 DIAGNOSIS — E11.10 DIABETIC KETOACIDOSIS WITHOUT COMA ASSOCIATED WITH TYPE 2 DIABETES MELLITUS: ICD-10-CM

## 2025-07-17 DIAGNOSIS — Z79.4 TYPE 2 DIABETES MELLITUS WITH HYPERGLYCEMIA, WITH LONG-TERM CURRENT USE OF INSULIN: ICD-10-CM

## 2025-07-17 DIAGNOSIS — E11.65 TYPE 2 DIABETES MELLITUS WITH HYPERGLYCEMIA, WITH LONG-TERM CURRENT USE OF INSULIN: ICD-10-CM

## 2025-07-17 NOTE — TELEPHONE ENCOUNTER
Spoke with pt;p pt stated he's out of town ([place]) stated he needed refills on Metformin & Meloxicam; pt stated he will schedule appt once he return /[place]

## 2025-07-17 NOTE — PROGRESS NOTES
Replying to Campaign Questionnaire for Overdue HM: Labs and DM eye exam.    Lab orders already in chart.  Called pt to schedule lab and Ophthalmology appt. NO answer, left VM and sent portal message with appointments dates and time.

## 2025-07-17 NOTE — TELEPHONE ENCOUNTER
Copied from CRM #[id number]. Topic: General Inquiry - Return Call  >> Jul 16, 2025  4:12 PM Anyi wrote:  .Type:  Patient Returning Call    Who Called: Kvng   Who Left Message for Patient: nurse   Does the patient know what this is regarding?: yes   Would the patient rather a call back or a response via [email or web address]?  Call back   Best Call Back Number: [phone]  Additional Information:

## 2025-07-21 RX ORDER — MELOXICAM 15 MG/1
15 TABLET ORAL DAILY
Qty: 30 TABLET | Refills: 0 | Status: SHIPPED | OUTPATIENT
Start: 2025-07-21

## 2025-07-21 RX ORDER — METFORMIN HYDROCHLORIDE 1000 MG/1
1000 TABLET ORAL 2 TIMES DAILY WITH MEALS
Qty: 60 TABLET | Refills: 0 | Status: SHIPPED | OUTPATIENT
Start: 2025-07-21

## 2025-07-22 ENCOUNTER — PATIENT OUTREACH (OUTPATIENT)
Dept: ADMINISTRATIVE | Facility: HOSPITAL | Age: 55
End: 2025-07-22
Payer: COMMERCIAL

## 2025-08-15 ENCOUNTER — TELEPHONE (OUTPATIENT)
Dept: INTERNAL MEDICINE | Facility: CLINIC | Age: 55
End: 2025-08-15
Payer: COMMERCIAL

## 2025-08-18 ENCOUNTER — TELEPHONE (OUTPATIENT)
Dept: DIABETES | Facility: CLINIC | Age: 55
End: 2025-08-18
Payer: COMMERCIAL

## 2025-08-18 ENCOUNTER — TELEPHONE (OUTPATIENT)
Dept: UROLOGY | Facility: CLINIC | Age: 55
End: 2025-08-18
Payer: COMMERCIAL

## 2025-08-18 ENCOUNTER — OFFICE VISIT (OUTPATIENT)
Dept: INTERNAL MEDICINE | Facility: CLINIC | Age: 55
End: 2025-08-18
Payer: COMMERCIAL

## 2025-08-18 ENCOUNTER — LAB VISIT (OUTPATIENT)
Dept: LAB | Facility: HOSPITAL | Age: 55
End: 2025-08-18
Attending: NURSE PRACTITIONER
Payer: COMMERCIAL

## 2025-08-18 VITALS
WEIGHT: 275.56 LBS | HEART RATE: 93 BPM | HEIGHT: 72 IN | BODY MASS INDEX: 37.32 KG/M2 | DIASTOLIC BLOOD PRESSURE: 82 MMHG | SYSTOLIC BLOOD PRESSURE: 136 MMHG | TEMPERATURE: 97 F | OXYGEN SATURATION: 95 %

## 2025-08-18 DIAGNOSIS — N20.0 NEPHROLITHIASIS: ICD-10-CM

## 2025-08-18 DIAGNOSIS — E11.65 TYPE 2 DIABETES MELLITUS WITH HYPERGLYCEMIA, WITH LONG-TERM CURRENT USE OF INSULIN: ICD-10-CM

## 2025-08-18 DIAGNOSIS — E11.65 UNCONTROLLED TYPE 2 DIABETES MELLITUS WITH HYPERGLYCEMIA: ICD-10-CM

## 2025-08-18 DIAGNOSIS — E11.10 DIABETIC KETOACIDOSIS WITHOUT COMA ASSOCIATED WITH TYPE 2 DIABETES MELLITUS: ICD-10-CM

## 2025-08-18 DIAGNOSIS — J02.9 REFLUX PHARYNGITIS: ICD-10-CM

## 2025-08-18 DIAGNOSIS — J44.89 ASTHMA WITH COPD: Chronic | ICD-10-CM

## 2025-08-18 DIAGNOSIS — R06.02 SHORTNESS OF BREATH: ICD-10-CM

## 2025-08-18 DIAGNOSIS — Z79.4 TYPE 2 DIABETES MELLITUS WITH HYPERGLYCEMIA, WITH LONG-TERM CURRENT USE OF INSULIN: ICD-10-CM

## 2025-08-18 DIAGNOSIS — Z09 HOSPITAL DISCHARGE FOLLOW-UP: Primary | ICD-10-CM

## 2025-08-18 DIAGNOSIS — Z91.148 NONCOMPLIANCE WITH MEDICATION REGIMEN: ICD-10-CM

## 2025-08-18 LAB
ALBUMIN SERPL BCP-MCNC: 4.3 G/DL (ref 3.5–5.2)
ALP SERPL-CCNC: 151 UNIT/L (ref 40–150)
ALT SERPL W/O P-5'-P-CCNC: 50 UNIT/L (ref 0–55)
ANION GAP (OHS): 13 MMOL/L (ref 8–16)
AST SERPL-CCNC: 42 UNIT/L (ref 0–50)
BILIRUB SERPL-MCNC: 0.7 MG/DL (ref 0.1–1)
BUN SERPL-MCNC: 27 MG/DL (ref 6–20)
CALCIUM SERPL-MCNC: 9.8 MG/DL (ref 8.7–10.5)
CHLORIDE SERPL-SCNC: 106 MMOL/L (ref 95–110)
CO2 SERPL-SCNC: 18 MMOL/L (ref 23–29)
CREAT SERPL-MCNC: 1.4 MG/DL (ref 0.5–1.4)
EAG (OHS): 272 MG/DL (ref 68–131)
GFR SERPLBLD CREATININE-BSD FMLA CKD-EPI: 60 ML/MIN/1.73/M2
GLUCOSE SERPL-MCNC: 254 MG/DL (ref 70–110)
HBA1C MFR BLD: 11.1 % (ref 4–5.6)
POTASSIUM SERPL-SCNC: 4.3 MMOL/L (ref 3.5–5.1)
PROT SERPL-MCNC: 8.1 GM/DL (ref 6–8.4)
SODIUM SERPL-SCNC: 137 MMOL/L (ref 136–145)

## 2025-08-18 PROCEDURE — 99215 OFFICE O/P EST HI 40 MIN: CPT | Mod: S$GLB,,, | Performed by: NURSE PRACTITIONER

## 2025-08-18 PROCEDURE — 36415 COLL VENOUS BLD VENIPUNCTURE: CPT

## 2025-08-18 PROCEDURE — G2211 COMPLEX E/M VISIT ADD ON: HCPCS | Mod: S$GLB,,, | Performed by: NURSE PRACTITIONER

## 2025-08-18 PROCEDURE — 4010F ACE/ARB THERAPY RXD/TAKEN: CPT | Mod: CPTII,S$GLB,, | Performed by: NURSE PRACTITIONER

## 2025-08-18 PROCEDURE — 3008F BODY MASS INDEX DOCD: CPT | Mod: CPTII,S$GLB,, | Performed by: NURSE PRACTITIONER

## 2025-08-18 PROCEDURE — 3075F SYST BP GE 130 - 139MM HG: CPT | Mod: CPTII,S$GLB,, | Performed by: NURSE PRACTITIONER

## 2025-08-18 PROCEDURE — 83036 HEMOGLOBIN GLYCOSYLATED A1C: CPT

## 2025-08-18 PROCEDURE — 84132 ASSAY OF SERUM POTASSIUM: CPT

## 2025-08-18 PROCEDURE — 3079F DIAST BP 80-89 MM HG: CPT | Mod: CPTII,S$GLB,, | Performed by: NURSE PRACTITIONER

## 2025-08-18 PROCEDURE — 99999 PR PBB SHADOW E&M-EST. PATIENT-LVL V: CPT | Mod: PBBFAC,,, | Performed by: NURSE PRACTITIONER

## 2025-08-18 RX ORDER — OMEPRAZOLE 40 MG/1
40 CAPSULE, DELAYED RELEASE ORAL DAILY
Qty: 30 CAPSULE | Refills: 11 | Status: SHIPPED | OUTPATIENT
Start: 2025-08-18 | End: 2026-08-18

## 2025-08-18 RX ORDER — ALBUTEROL SULFATE 90 UG/1
2 INHALANT RESPIRATORY (INHALATION) EVERY 4 HOURS PRN
Qty: 18 G | Refills: 11 | Status: SHIPPED | OUTPATIENT
Start: 2025-08-18

## 2025-08-18 RX ORDER — METFORMIN HYDROCHLORIDE 1000 MG/1
1000 TABLET ORAL 2 TIMES DAILY WITH MEALS
Qty: 60 TABLET | Refills: 6 | Status: SHIPPED | OUTPATIENT
Start: 2025-08-18

## 2025-08-18 RX ORDER — ROSUVASTATIN CALCIUM 40 MG/1
40 TABLET, COATED ORAL NIGHTLY
Qty: 90 TABLET | Refills: 3 | Status: SHIPPED | OUTPATIENT
Start: 2025-08-18 | End: 2026-08-18

## 2025-08-19 ENCOUNTER — RESULTS FOLLOW-UP (OUTPATIENT)
Dept: INTERNAL MEDICINE | Facility: CLINIC | Age: 55
End: 2025-08-19
Payer: COMMERCIAL

## 2025-08-19 DIAGNOSIS — E11.10 DIABETIC KETOACIDOSIS WITHOUT COMA ASSOCIATED WITH TYPE 2 DIABETES MELLITUS: ICD-10-CM

## 2025-08-19 DIAGNOSIS — E11.65 TYPE 2 DIABETES MELLITUS WITH HYPERGLYCEMIA, WITH LONG-TERM CURRENT USE OF INSULIN: ICD-10-CM

## 2025-08-19 DIAGNOSIS — Z79.4 TYPE 2 DIABETES MELLITUS WITH HYPERGLYCEMIA, WITH LONG-TERM CURRENT USE OF INSULIN: ICD-10-CM

## 2025-08-19 RX ORDER — INSULIN GLARGINE 100 [IU]/ML
50 INJECTION, SOLUTION SUBCUTANEOUS 2 TIMES DAILY
Qty: 15 EACH | Refills: 11 | Status: SHIPPED | OUTPATIENT
Start: 2025-08-19

## 2025-08-19 RX ORDER — INSULIN GLARGINE 100 [IU]/ML
INJECTION, SOLUTION SUBCUTANEOUS
Qty: 15 EACH | Refills: 11 | Status: SHIPPED | OUTPATIENT
Start: 2025-08-19 | End: 2025-08-19

## 2025-08-25 ENCOUNTER — OFFICE VISIT (OUTPATIENT)
Facility: CLINIC | Age: 55
End: 2025-08-25
Payer: COMMERCIAL

## 2025-08-25 ENCOUNTER — HOSPITAL ENCOUNTER (OUTPATIENT)
Dept: RADIOLOGY | Facility: HOSPITAL | Age: 55
Discharge: HOME | End: 2025-08-25
Attending: UROLOGY
Payer: COMMERCIAL

## 2025-08-25 VITALS
SYSTOLIC BLOOD PRESSURE: 116 MMHG | HEIGHT: 72 IN | BODY MASS INDEX: 37.32 KG/M2 | HEART RATE: 94 BPM | DIASTOLIC BLOOD PRESSURE: 82 MMHG | WEIGHT: 275.56 LBS

## 2025-08-25 DIAGNOSIS — N20.0 KIDNEY STONE: ICD-10-CM

## 2025-08-25 DIAGNOSIS — N28.1 COMPLEX RENAL CYST: ICD-10-CM

## 2025-08-25 DIAGNOSIS — N20.0 KIDNEY STONE: Primary | ICD-10-CM

## 2025-08-25 DIAGNOSIS — R31.0 GROSS HEMATURIA: ICD-10-CM

## 2025-08-25 DIAGNOSIS — N23 RENAL COLIC: ICD-10-CM

## 2025-08-25 DIAGNOSIS — Z87.442 PERSONAL HISTORY OF URINARY CALCULI: ICD-10-CM

## 2025-08-25 PROCEDURE — 74018 RADEX ABDOMEN 1 VIEW: CPT | Mod: 26,,, | Performed by: RADIOLOGY

## 2025-08-25 PROCEDURE — 4010F ACE/ARB THERAPY RXD/TAKEN: CPT | Mod: CPTII,S$GLB,, | Performed by: UROLOGY

## 2025-08-25 PROCEDURE — 3079F DIAST BP 80-89 MM HG: CPT | Mod: CPTII,S$GLB,, | Performed by: UROLOGY

## 2025-08-25 PROCEDURE — 74018 RADEX ABDOMEN 1 VIEW: CPT | Mod: TC,PO

## 2025-08-25 PROCEDURE — 3008F BODY MASS INDEX DOCD: CPT | Mod: CPTII,S$GLB,, | Performed by: UROLOGY

## 2025-08-25 PROCEDURE — 99999 PR PBB SHADOW E&M-EST. PATIENT-LVL V: CPT | Mod: PBBFAC,,, | Performed by: UROLOGY

## 2025-08-25 PROCEDURE — 3046F HEMOGLOBIN A1C LEVEL >9.0%: CPT | Mod: CPTII,S$GLB,, | Performed by: UROLOGY

## 2025-08-25 PROCEDURE — 3074F SYST BP LT 130 MM HG: CPT | Mod: CPTII,S$GLB,, | Performed by: UROLOGY

## 2025-08-25 PROCEDURE — 99204 OFFICE O/P NEW MOD 45 MIN: CPT | Mod: S$GLB,,, | Performed by: UROLOGY

## 2025-08-25 RX ORDER — TAMSULOSIN HYDROCHLORIDE 0.4 MG/1
0.4 CAPSULE ORAL NIGHTLY
Qty: 30 CAPSULE | Refills: 1 | Status: SHIPPED | OUTPATIENT
Start: 2025-08-25 | End: 2026-08-25

## 2025-08-25 RX ORDER — KETOROLAC TROMETHAMINE 10 MG/1
10 TABLET, FILM COATED ORAL EVERY 6 HOURS PRN
Qty: 20 TABLET | Refills: 1 | Status: SHIPPED | OUTPATIENT
Start: 2025-08-25 | End: 2025-08-26

## 2025-08-26 ENCOUNTER — LAB VISIT (OUTPATIENT)
Dept: LAB | Facility: HOSPITAL | Age: 55
End: 2025-08-26
Attending: UROLOGY
Payer: COMMERCIAL

## 2025-08-26 ENCOUNTER — OFFICE VISIT (OUTPATIENT)
Dept: INTERNAL MEDICINE | Facility: CLINIC | Age: 55
End: 2025-08-26
Payer: COMMERCIAL

## 2025-08-26 VITALS
OXYGEN SATURATION: 97 % | DIASTOLIC BLOOD PRESSURE: 80 MMHG | SYSTOLIC BLOOD PRESSURE: 118 MMHG | WEIGHT: 274.69 LBS | HEIGHT: 72 IN | HEART RATE: 81 BPM | BODY MASS INDEX: 37.21 KG/M2

## 2025-08-26 DIAGNOSIS — M54.16 LUMBAR RADICULOPATHY, CHRONIC: ICD-10-CM

## 2025-08-26 DIAGNOSIS — E11.59 HYPERTENSION ASSOCIATED WITH DIABETES: ICD-10-CM

## 2025-08-26 DIAGNOSIS — E11.65 UNCONTROLLED TYPE 2 DIABETES MELLITUS WITH HYPERGLYCEMIA: ICD-10-CM

## 2025-08-26 DIAGNOSIS — M96.1 LUMBAR POSTLAMINECTOMY SYNDROME: ICD-10-CM

## 2025-08-26 DIAGNOSIS — Z91.148 NONCOMPLIANCE WITH MEDICATION REGIMEN: ICD-10-CM

## 2025-08-26 DIAGNOSIS — Z12.5 SCREENING FOR PROSTATE CANCER: ICD-10-CM

## 2025-08-26 DIAGNOSIS — E78.2 MIXED DIABETIC HYPERLIPIDEMIA ASSOCIATED WITH TYPE 2 DIABETES MELLITUS: ICD-10-CM

## 2025-08-26 DIAGNOSIS — Z00.00 ANNUAL PHYSICAL EXAM: Primary | ICD-10-CM

## 2025-08-26 DIAGNOSIS — E66.01 SEVERE OBESITY (BMI 35.0-39.9) WITH COMORBIDITY: ICD-10-CM

## 2025-08-26 DIAGNOSIS — E11.69 MIXED DIABETIC HYPERLIPIDEMIA ASSOCIATED WITH TYPE 2 DIABETES MELLITUS: ICD-10-CM

## 2025-08-26 DIAGNOSIS — Z00.00 ANNUAL PHYSICAL EXAM: ICD-10-CM

## 2025-08-26 DIAGNOSIS — N20.0 NEPHROLITHIASIS: ICD-10-CM

## 2025-08-26 DIAGNOSIS — J02.9 REFLUX PHARYNGITIS: ICD-10-CM

## 2025-08-26 DIAGNOSIS — Z87.891 FORMER SMOKER: ICD-10-CM

## 2025-08-26 DIAGNOSIS — Z12.2 SCREENING FOR LUNG CANCER: ICD-10-CM

## 2025-08-26 DIAGNOSIS — I15.2 HYPERTENSION ASSOCIATED WITH DIABETES: ICD-10-CM

## 2025-08-26 DIAGNOSIS — J44.89 ASTHMA WITH COPD: ICD-10-CM

## 2025-08-26 LAB
ALBUMIN SERPL BCP-MCNC: 4 G/DL (ref 3.5–5.2)
ALBUMIN/CREAT UR: 13.6 UG/MG
ALP SERPL-CCNC: 143 UNIT/L (ref 40–150)
ALT SERPL W/O P-5'-P-CCNC: 44 UNIT/L (ref 0–55)
ANION GAP (OHS): 11 MMOL/L (ref 8–16)
AST SERPL-CCNC: 50 UNIT/L (ref 0–50)
BILIRUB SERPL-MCNC: 0.7 MG/DL (ref 0.1–1)
BUN SERPL-MCNC: 21 MG/DL (ref 6–20)
CALCIUM SERPL-MCNC: 9.4 MG/DL (ref 8.7–10.5)
CHLORIDE SERPL-SCNC: 109 MMOL/L (ref 95–110)
CHOLEST SERPL-MCNC: 167 MG/DL (ref 120–199)
CHOLEST/HDLC SERPL: 5.6 {RATIO} (ref 2–5)
CO2 SERPL-SCNC: 20 MMOL/L (ref 23–29)
CREAT SERPL-MCNC: 1.5 MG/DL (ref 0.5–1.4)
CREAT UR-MCNC: 140 MG/DL (ref 23–375)
ERYTHROCYTE [DISTWIDTH] IN BLOOD BY AUTOMATED COUNT: 14.6 % (ref 11.5–14.5)
GFR SERPLBLD CREATININE-BSD FMLA CKD-EPI: 55 ML/MIN/1.73/M2
GLUCOSE SERPL-MCNC: 172 MG/DL (ref 70–110)
HCT VFR BLD AUTO: 41.5 % (ref 40–54)
HDLC SERPL-MCNC: 30 MG/DL (ref 40–75)
HDLC SERPL: 18 % (ref 20–50)
HGB BLD-MCNC: 13.2 GM/DL (ref 14–18)
LDLC SERPL CALC-MCNC: ABNORMAL MG/DL
MCH RBC QN AUTO: 27.8 PG (ref 27–31)
MCHC RBC AUTO-ENTMCNC: 31.8 G/DL (ref 32–36)
MCV RBC AUTO: 87 FL (ref 82–98)
MICROALBUMIN UR-MCNC: 19 UG/ML
NONHDLC SERPL-MCNC: 137 MG/DL
PLATELET # BLD AUTO: 247 K/UL (ref 150–450)
PMV BLD AUTO: 11 FL (ref 9.2–12.9)
POTASSIUM SERPL-SCNC: 4.3 MMOL/L (ref 3.5–5.1)
PROT SERPL-MCNC: 7.5 GM/DL (ref 6–8.4)
PSA SERPL-MCNC: 0.68 NG/ML
RBC # BLD AUTO: 4.75 M/UL (ref 4.6–6.2)
SODIUM SERPL-SCNC: 140 MMOL/L (ref 136–145)
TRIGL SERPL-MCNC: 488 MG/DL (ref 30–150)
TSH SERPL-ACNC: 1.43 UIU/ML (ref 0.4–4)
WBC # BLD AUTO: 10.89 K/UL (ref 3.9–12.7)

## 2025-08-26 PROCEDURE — 1160F RVW MEDS BY RX/DR IN RCRD: CPT | Mod: CPTII,S$GLB,, | Performed by: FAMILY MEDICINE

## 2025-08-26 PROCEDURE — 3008F BODY MASS INDEX DOCD: CPT | Mod: CPTII,S$GLB,, | Performed by: FAMILY MEDICINE

## 2025-08-26 PROCEDURE — 99396 PREV VISIT EST AGE 40-64: CPT | Mod: S$GLB,,, | Performed by: FAMILY MEDICINE

## 2025-08-26 PROCEDURE — 3074F SYST BP LT 130 MM HG: CPT | Mod: CPTII,S$GLB,, | Performed by: FAMILY MEDICINE

## 2025-08-26 PROCEDURE — 84153 ASSAY OF PSA TOTAL: CPT

## 2025-08-26 PROCEDURE — 85027 COMPLETE CBC AUTOMATED: CPT

## 2025-08-26 PROCEDURE — 99999 PR PBB SHADOW E&M-EST. PATIENT-LVL IV: CPT | Mod: PBBFAC,,, | Performed by: FAMILY MEDICINE

## 2025-08-26 PROCEDURE — 36415 COLL VENOUS BLD VENIPUNCTURE: CPT

## 2025-08-26 PROCEDURE — 4010F ACE/ARB THERAPY RXD/TAKEN: CPT | Mod: CPTII,S$GLB,, | Performed by: FAMILY MEDICINE

## 2025-08-26 PROCEDURE — 84075 ASSAY ALKALINE PHOSPHATASE: CPT

## 2025-08-26 PROCEDURE — 82570 ASSAY OF URINE CREATININE: CPT

## 2025-08-26 PROCEDURE — 1159F MED LIST DOCD IN RCRD: CPT | Mod: CPTII,S$GLB,, | Performed by: FAMILY MEDICINE

## 2025-08-26 PROCEDURE — 3046F HEMOGLOBIN A1C LEVEL >9.0%: CPT | Mod: CPTII,S$GLB,, | Performed by: FAMILY MEDICINE

## 2025-08-26 PROCEDURE — 80061 LIPID PANEL: CPT

## 2025-08-26 PROCEDURE — 3079F DIAST BP 80-89 MM HG: CPT | Mod: CPTII,S$GLB,, | Performed by: FAMILY MEDICINE

## 2025-08-26 PROCEDURE — 84443 ASSAY THYROID STIM HORMONE: CPT

## 2025-08-26 RX ORDER — OMEGA-3-ACID ETHYL ESTERS 1 G/1
2 CAPSULE, LIQUID FILLED ORAL 2 TIMES DAILY
Qty: 360 CAPSULE | Refills: 3 | Status: SHIPPED | OUTPATIENT
Start: 2025-08-26 | End: 2026-08-26

## 2025-08-26 RX ORDER — ASPIRIN 81 MG/1
81 TABLET ORAL DAILY
Qty: 90 TABLET | Refills: 3 | Status: SHIPPED | OUTPATIENT
Start: 2025-08-26 | End: 2026-08-26

## 2025-08-26 RX ORDER — INSULIN ASPART 100 [IU]/ML
10 INJECTION, SOLUTION INTRAVENOUS; SUBCUTANEOUS
Qty: 9 ML | Refills: 11 | Status: SHIPPED | OUTPATIENT
Start: 2025-08-26 | End: 2026-08-26

## 2025-08-27 ENCOUNTER — TELEPHONE (OUTPATIENT)
Dept: UROLOGY | Facility: CLINIC | Age: 55
End: 2025-08-27
Payer: COMMERCIAL

## 2025-08-29 ENCOUNTER — TELEPHONE (OUTPATIENT)
Dept: UROLOGY | Facility: CLINIC | Age: 55
End: 2025-08-29
Payer: COMMERCIAL

## 2025-09-02 ENCOUNTER — TELEPHONE (OUTPATIENT)
Dept: UROLOGY | Facility: CLINIC | Age: 55
End: 2025-09-02
Payer: COMMERCIAL

## 2025-09-02 ENCOUNTER — HOSPITAL ENCOUNTER (OUTPATIENT)
Dept: RADIOLOGY | Facility: HOSPITAL | Age: 55
Discharge: HOME | End: 2025-09-02
Attending: UROLOGY
Payer: COMMERCIAL

## 2025-09-02 DIAGNOSIS — N20.0 KIDNEY STONE: ICD-10-CM

## 2025-09-02 DIAGNOSIS — N23 RENAL COLIC: ICD-10-CM

## 2025-09-02 DIAGNOSIS — R31.0 GROSS HEMATURIA: ICD-10-CM

## 2025-09-02 PROCEDURE — 74176 CT ABD & PELVIS W/O CONTRAST: CPT | Mod: TC

## 2025-09-02 PROCEDURE — 74176 CT ABD & PELVIS W/O CONTRAST: CPT | Mod: 26,,, | Performed by: STUDENT IN AN ORGANIZED HEALTH CARE EDUCATION/TRAINING PROGRAM

## 2025-09-03 ENCOUNTER — OFFICE VISIT (OUTPATIENT)
Dept: UROLOGY | Facility: CLINIC | Age: 55
End: 2025-09-03
Payer: COMMERCIAL

## 2025-09-03 ENCOUNTER — HOSPITAL ENCOUNTER (OUTPATIENT)
Dept: RADIOLOGY | Facility: HOSPITAL | Age: 55
Discharge: HOME | End: 2025-09-03
Attending: UROLOGY
Payer: COMMERCIAL

## 2025-09-03 ENCOUNTER — HOSPITAL ENCOUNTER (OUTPATIENT)
Dept: RADIOLOGY | Facility: HOSPITAL | Age: 55
Discharge: HOME | End: 2025-09-03
Attending: FAMILY MEDICINE
Payer: COMMERCIAL

## 2025-09-03 ENCOUNTER — HOSPITAL ENCOUNTER (OUTPATIENT)
Dept: CARDIOLOGY | Facility: HOSPITAL | Age: 55
Discharge: HOME | End: 2025-09-03
Attending: UROLOGY
Payer: COMMERCIAL

## 2025-09-03 VITALS
HEART RATE: 89 BPM | WEIGHT: 274.69 LBS | DIASTOLIC BLOOD PRESSURE: 68 MMHG | BODY MASS INDEX: 37.21 KG/M2 | HEIGHT: 72 IN | SYSTOLIC BLOOD PRESSURE: 108 MMHG

## 2025-09-03 DIAGNOSIS — Z87.891 FORMER SMOKER: ICD-10-CM

## 2025-09-03 DIAGNOSIS — N28.1 COMPLEX RENAL CYST: ICD-10-CM

## 2025-09-03 DIAGNOSIS — R31.29 OTHER MICROSCOPIC HEMATURIA: ICD-10-CM

## 2025-09-03 DIAGNOSIS — Z01.818 PRE-OP TESTING: ICD-10-CM

## 2025-09-03 DIAGNOSIS — Z12.2 SCREENING FOR LUNG CANCER: ICD-10-CM

## 2025-09-03 DIAGNOSIS — N20.0 KIDNEY STONE: Primary | ICD-10-CM

## 2025-09-03 DIAGNOSIS — Z00.00 ANNUAL PHYSICAL EXAM: ICD-10-CM

## 2025-09-03 LAB
OHS QRS DURATION: 84 MS
OHS QTC CALCULATION: 423 MS

## 2025-09-03 PROCEDURE — 3061F NEG MICROALBUMINURIA REV: CPT | Mod: CPTII,S$GLB,, | Performed by: UROLOGY

## 2025-09-03 PROCEDURE — 71046 X-RAY EXAM CHEST 2 VIEWS: CPT | Mod: 26,,, | Performed by: RADIOLOGY

## 2025-09-03 PROCEDURE — 99215 OFFICE O/P EST HI 40 MIN: CPT | Mod: S$GLB,,, | Performed by: UROLOGY

## 2025-09-03 PROCEDURE — 1159F MED LIST DOCD IN RCRD: CPT | Mod: CPTII,S$GLB,, | Performed by: UROLOGY

## 2025-09-03 PROCEDURE — 3074F SYST BP LT 130 MM HG: CPT | Mod: CPTII,S$GLB,, | Performed by: UROLOGY

## 2025-09-03 PROCEDURE — 71271 CT THORAX LUNG CANCER SCR C-: CPT | Mod: TC

## 2025-09-03 PROCEDURE — 93010 ELECTROCARDIOGRAM REPORT: CPT | Mod: ,,, | Performed by: INTERNAL MEDICINE

## 2025-09-03 PROCEDURE — 3078F DIAST BP <80 MM HG: CPT | Mod: CPTII,S$GLB,, | Performed by: UROLOGY

## 2025-09-03 PROCEDURE — 3046F HEMOGLOBIN A1C LEVEL >9.0%: CPT | Mod: CPTII,S$GLB,, | Performed by: UROLOGY

## 2025-09-03 PROCEDURE — 93005 ELECTROCARDIOGRAM TRACING: CPT

## 2025-09-03 PROCEDURE — 4010F ACE/ARB THERAPY RXD/TAKEN: CPT | Mod: CPTII,S$GLB,, | Performed by: UROLOGY

## 2025-09-03 PROCEDURE — 99999 PR PBB SHADOW E&M-EST. PATIENT-LVL V: CPT | Mod: PBBFAC,,, | Performed by: UROLOGY

## 2025-09-03 PROCEDURE — 71271 CT THORAX LUNG CANCER SCR C-: CPT | Mod: 26,,, | Performed by: RADIOLOGY

## 2025-09-03 PROCEDURE — 71046 X-RAY EXAM CHEST 2 VIEWS: CPT | Mod: TC

## 2025-09-03 PROCEDURE — 3008F BODY MASS INDEX DOCD: CPT | Mod: CPTII,S$GLB,, | Performed by: UROLOGY

## 2025-09-03 PROCEDURE — 3066F NEPHROPATHY DOC TX: CPT | Mod: CPTII,S$GLB,, | Performed by: UROLOGY

## 2025-09-04 ENCOUNTER — TELEPHONE (OUTPATIENT)
Dept: INTERNAL MEDICINE | Facility: CLINIC | Age: 55
End: 2025-09-04
Payer: COMMERCIAL

## 2025-09-05 ENCOUNTER — TELEPHONE (OUTPATIENT)
Dept: PREADMISSION TESTING | Facility: HOSPITAL | Age: 55
End: 2025-09-05
Payer: COMMERCIAL

## 2025-09-05 ENCOUNTER — PATIENT MESSAGE (OUTPATIENT)
Dept: PREADMISSION TESTING | Facility: HOSPITAL | Age: 55
End: 2025-09-05
Payer: COMMERCIAL

## 2025-09-05 ENCOUNTER — TELEPHONE (OUTPATIENT)
Dept: UROLOGY | Facility: CLINIC | Age: 55
End: 2025-09-05
Payer: COMMERCIAL